# Patient Record
Sex: FEMALE | Race: WHITE | NOT HISPANIC OR LATINO | Employment: FULL TIME | URBAN - METROPOLITAN AREA
[De-identification: names, ages, dates, MRNs, and addresses within clinical notes are randomized per-mention and may not be internally consistent; named-entity substitution may affect disease eponyms.]

---

## 2023-09-19 LAB — URATE (MG/DL) IN SER/PLAS: 5.8 MG/DL (ref 2.3–6.7)

## 2023-10-05 PROBLEM — M79.10 MYALGIA: Status: ACTIVE | Noted: 2023-10-05

## 2023-10-05 PROBLEM — I10 BENIGN DIASTOLIC HYPERTENSION: Status: ACTIVE | Noted: 2023-10-05

## 2023-10-05 PROBLEM — M25.562 ACUTE PAIN OF BOTH KNEES: Status: ACTIVE | Noted: 2023-10-05

## 2023-10-05 PROBLEM — D50.9 IRON DEFICIENCY ANEMIA: Status: ACTIVE | Noted: 2023-10-05

## 2023-10-05 PROBLEM — M17.0 OSTEOARTHRITIS OF KNEES, BILATERAL: Status: ACTIVE | Noted: 2023-10-05

## 2023-10-05 PROBLEM — E03.9 HYPOTHYROIDISM: Status: ACTIVE | Noted: 2023-10-05

## 2023-10-05 PROBLEM — F32.A DEPRESSION: Status: ACTIVE | Noted: 2023-10-05

## 2023-10-05 PROBLEM — R10.2 PELVIC PAIN IN FEMALE: Status: ACTIVE | Noted: 2023-10-05

## 2023-10-05 PROBLEM — G44.209 MUSCLE TENSION HEADACHE: Status: ACTIVE | Noted: 2023-10-05

## 2023-10-05 PROBLEM — M24.9 HYPERMOBILITY OF JOINT: Status: ACTIVE | Noted: 2023-10-05

## 2023-10-05 PROBLEM — M25.549 PAIN IN MULTIPLE FINGER JOINTS: Status: ACTIVE | Noted: 2023-10-05

## 2023-10-05 PROBLEM — N92.6 IRREGULAR MENSES: Status: ACTIVE | Noted: 2023-10-05

## 2023-10-05 PROBLEM — E55.9 VITAMIN D DEFICIENCY: Status: ACTIVE | Noted: 2023-10-05

## 2023-10-05 PROBLEM — M54.50 LOW BACK PAIN: Status: ACTIVE | Noted: 2023-10-05

## 2023-10-05 PROBLEM — M54.32 SCIATICA OF LEFT SIDE: Status: ACTIVE | Noted: 2023-10-05

## 2023-10-05 PROBLEM — N95.0 POST-MENOPAUSAL BLEEDING: Status: ACTIVE | Noted: 2023-10-05

## 2023-10-05 PROBLEM — M25.561 ACUTE PAIN OF BOTH KNEES: Status: ACTIVE | Noted: 2023-10-05

## 2023-10-05 PROBLEM — F41.9 ANXIETY: Status: ACTIVE | Noted: 2023-10-05

## 2023-10-05 PROBLEM — E78.5 HYPERLIPIDEMIA: Status: ACTIVE | Noted: 2023-10-05

## 2023-10-05 PROBLEM — L30.9 ECZEMA: Status: ACTIVE | Noted: 2023-10-05

## 2023-10-05 PROBLEM — B37.0 THRUSH: Status: ACTIVE | Noted: 2023-10-05

## 2023-10-05 RX ORDER — METOPROLOL SUCCINATE 100 MG/1
100 TABLET, EXTENDED RELEASE ORAL DAILY
COMMUNITY
End: 2024-03-01

## 2023-10-05 RX ORDER — LEVOTHYROXINE SODIUM 100 UG/1
1 TABLET ORAL DAILY
COMMUNITY
Start: 2014-06-11 | End: 2023-12-07

## 2023-10-05 RX ORDER — VENLAFAXINE HYDROCHLORIDE 150 MG/1
150 CAPSULE, EXTENDED RELEASE ORAL NIGHTLY
COMMUNITY
End: 2024-01-02

## 2023-10-05 RX ORDER — CHLORTHALIDONE 25 MG/1
12.5 TABLET ORAL DAILY
COMMUNITY
End: 2024-01-02

## 2023-10-05 RX ORDER — CALCIUM CITRATE/VITAMIN D3 315MG-5MCG
TABLET ORAL
COMMUNITY

## 2023-10-05 RX ORDER — BUPROPION HYDROCHLORIDE 300 MG/1
1 TABLET ORAL
COMMUNITY
Start: 2014-06-11 | End: 2023-10-31 | Stop reason: ALTCHOICE

## 2023-10-06 ENCOUNTER — TREATMENT (OUTPATIENT)
Dept: PHYSICAL THERAPY | Facility: CLINIC | Age: 58
End: 2023-10-06
Payer: MEDICAID

## 2023-10-06 DIAGNOSIS — M25.561 CHRONIC PAIN OF BOTH KNEES: Primary | ICD-10-CM

## 2023-10-06 DIAGNOSIS — G89.29 CHRONIC PAIN OF BOTH KNEES: Primary | ICD-10-CM

## 2023-10-06 DIAGNOSIS — M25.562 CHRONIC PAIN OF BOTH KNEES: Primary | ICD-10-CM

## 2023-10-06 PROCEDURE — 97110 THERAPEUTIC EXERCISES: CPT | Mod: GP

## 2023-10-06 NOTE — PROGRESS NOTES
Physical Therapy    Discharge Summary    Name: Thuy Elizondo  MRN: [id number]  : 1965  Date: 10/06/23  Visit Count: 4  Insurance: [employer]; $0 co-pay    Discharge Summary: PT    Discharge Information: Date of discharge 10/6/23    Discharge Status: independent with HEP     Rehab Discharge Reason: Achieved all and/or the most significant goals(s)     Goals:  In 2 weeks, pt will rate pain 0-5 on the numeric pain scale to allow for restful nights of sleep. (Not met, 75% met)  In 2 weeks, pt will be able to sit for 10 minutes without experiencing pain. (100% met)  In 4 weeks, pt's lower extremity strength will grossly be one MMT classification improved. (100% met)  In 4 weeks, pt will be able to perform entire workday without an increase in symptoms. (100% met)  In 6 weeks, pt's LEFS will be +10. (100% met)  In 6 weeks, pt will be independent with HEP. (100% met)    Assessment: Upon re-examination, pt displayed improvements in gross lower extremity strength, balance, functional ability. Pt has met 5/6 goals and is to be discharged home with HEP. Pt verbalizes understanding and agreement in discharge plan. Extensive HEP and various resistance bands provided for increased intensity with HEP.       Plan: Discharge.       Current Problem  1. Chronic pain of both knees             Pain: 2/10       Subjective:   Patient reports that she has been doing okay since last session. Notes that she can do mostly all of her functional and recreational activities. Says that she is ready for today to be discharge.    Objective:   Gait: normal gait pattern    Knee AROM  Flexion  R: 130 deg  L: 132 deg  Extension  R: +1 deg  L: +2 deg     LE MMT   Knee Flexion  R: 5  L: 5  Knee Extension  R: 5  L: 5    Flexibility  Hamstrings: min tight B  Quads: min tight B  Hip Flexors: min tight B    Treatments: pull forward Treatments from previous session or use flowsheet   Therapeutic Exercise  Therapeutic Exercise Performed:  Yes  Therapeutic Exercise Activity 1: upright bike x 5 min  Therapeutic Exercise Activity 2: objective measures  Therapeutic Exercise Activity 3: cybex leg press 70# 2 x 15  Therapeutic Exercise Activity 4: cybex HS curl 30# 2 x 12  Therapeutic Exercise Activity 5: cybex 4 way hip, flexion, abduction 22# 2 x 12 B  Therapeutic Exercise Activity 6: cybex hip abduction/ adduction machine 25# 2 x 12  Therapeutic Exercise Activity 7: biodex x 2 min      PRAVIN EastonPT

## 2023-10-25 ENCOUNTER — APPOINTMENT (OUTPATIENT)
Dept: PRIMARY CARE | Facility: CLINIC | Age: 58
End: 2023-10-25
Payer: MEDICAID

## 2023-10-31 ENCOUNTER — LAB (OUTPATIENT)
Dept: LAB | Facility: LAB | Age: 58
End: 2023-10-31
Payer: MEDICAID

## 2023-10-31 ENCOUNTER — OFFICE VISIT (OUTPATIENT)
Dept: PRIMARY CARE | Facility: CLINIC | Age: 58
End: 2023-10-31
Payer: MEDICAID

## 2023-10-31 VITALS
BODY MASS INDEX: 38.27 KG/M2 | SYSTOLIC BLOOD PRESSURE: 126 MMHG | HEIGHT: 63 IN | DIASTOLIC BLOOD PRESSURE: 70 MMHG | WEIGHT: 216 LBS | HEART RATE: 69 BPM

## 2023-10-31 DIAGNOSIS — F32.A DEPRESSION, UNSPECIFIED DEPRESSION TYPE: ICD-10-CM

## 2023-10-31 DIAGNOSIS — F41.9 ANXIETY: ICD-10-CM

## 2023-10-31 DIAGNOSIS — E78.5 HYPERLIPIDEMIA, UNSPECIFIED HYPERLIPIDEMIA TYPE: ICD-10-CM

## 2023-10-31 DIAGNOSIS — E03.9 HYPOTHYROIDISM, UNSPECIFIED TYPE: ICD-10-CM

## 2023-10-31 DIAGNOSIS — Z80.0 FAMILY HISTORY OF COLON CANCER: ICD-10-CM

## 2023-10-31 DIAGNOSIS — Z12.11 ENCOUNTER FOR SCREENING FOR MALIGNANT NEOPLASM OF COLON: ICD-10-CM

## 2023-10-31 DIAGNOSIS — L30.9 DERMATITIS: ICD-10-CM

## 2023-10-31 DIAGNOSIS — I10 BENIGN DIASTOLIC HYPERTENSION: ICD-10-CM

## 2023-10-31 DIAGNOSIS — E55.9 VITAMIN D DEFICIENCY: ICD-10-CM

## 2023-10-31 DIAGNOSIS — Z00.00 ANNUAL PHYSICAL EXAM: ICD-10-CM

## 2023-10-31 DIAGNOSIS — Z00.00 ANNUAL PHYSICAL EXAM: Primary | ICD-10-CM

## 2023-10-31 PROBLEM — M25.561 CHRONIC PAIN OF RIGHT KNEE: Status: RESOLVED | Noted: 2018-08-01 | Resolved: 2023-10-31

## 2023-10-31 PROBLEM — G89.29 CHRONIC PAIN OF RIGHT KNEE: Status: RESOLVED | Noted: 2018-08-01 | Resolved: 2023-10-31

## 2023-10-31 PROBLEM — K21.9 GERD (GASTROESOPHAGEAL REFLUX DISEASE): Status: ACTIVE | Noted: 2023-10-31

## 2023-10-31 LAB
25(OH)D3 SERPL-MCNC: 67 NG/ML (ref 30–100)
ALBUMIN SERPL BCP-MCNC: 4.2 G/DL (ref 3.4–5)
ALP SERPL-CCNC: 78 U/L (ref 33–110)
ALT SERPL W P-5'-P-CCNC: 18 U/L (ref 7–45)
ANION GAP SERPL CALC-SCNC: 10 MMOL/L (ref 10–20)
APPEARANCE UR: CLEAR
AST SERPL W P-5'-P-CCNC: 17 U/L (ref 9–39)
BACTERIA #/AREA URNS AUTO: ABNORMAL /HPF
BILIRUB SERPL-MCNC: 0.6 MG/DL (ref 0–1.2)
BILIRUB UR STRIP.AUTO-MCNC: NEGATIVE MG/DL
BUN SERPL-MCNC: 11 MG/DL (ref 6–23)
CALCIUM SERPL-MCNC: 9.8 MG/DL (ref 8.6–10.6)
CHLORIDE SERPL-SCNC: 103 MMOL/L (ref 98–107)
CHOLEST SERPL-MCNC: 221 MG/DL (ref 0–199)
CHOLESTEROL/HDL RATIO: 4.8
CO2 SERPL-SCNC: 32 MMOL/L (ref 21–32)
COLOR UR: YELLOW
CREAT SERPL-MCNC: 0.58 MG/DL (ref 0.5–1.05)
ERYTHROCYTE [DISTWIDTH] IN BLOOD BY AUTOMATED COUNT: 13.2 % (ref 11.5–14.5)
EST. AVERAGE GLUCOSE BLD GHB EST-MCNC: 100 MG/DL
GFR SERPL CREATININE-BSD FRML MDRD: >90 ML/MIN/1.73M*2
GLUCOSE SERPL-MCNC: 94 MG/DL (ref 74–99)
GLUCOSE UR STRIP.AUTO-MCNC: NEGATIVE MG/DL
HBA1C MFR BLD: 5.1 %
HCT VFR BLD AUTO: 41.7 % (ref 36–46)
HDLC SERPL-MCNC: 46.5 MG/DL
HGB BLD-MCNC: 13 G/DL (ref 12–16)
KETONES UR STRIP.AUTO-MCNC: NEGATIVE MG/DL
LDLC SERPL CALC-MCNC: 151 MG/DL
LEUKOCYTE ESTERASE UR QL STRIP.AUTO: ABNORMAL
MCH RBC QN AUTO: 29.5 PG (ref 26–34)
MCHC RBC AUTO-ENTMCNC: 31.2 G/DL (ref 32–36)
MCV RBC AUTO: 95 FL (ref 80–100)
MUCOUS THREADS #/AREA URNS AUTO: ABNORMAL /LPF
NITRITE UR QL STRIP.AUTO: NEGATIVE
NON HDL CHOLESTEROL: 175 MG/DL (ref 0–149)
NRBC BLD-RTO: 0 /100 WBCS (ref 0–0)
PH UR STRIP.AUTO: 7 [PH]
PLATELET # BLD AUTO: 225 X10*3/UL (ref 150–450)
PMV BLD AUTO: 11.8 FL (ref 7.5–11.5)
POTASSIUM SERPL-SCNC: 4 MMOL/L (ref 3.5–5.3)
PROT SERPL-MCNC: 6.6 G/DL (ref 6.4–8.2)
PROT UR STRIP.AUTO-MCNC: NEGATIVE MG/DL
RBC # BLD AUTO: 4.4 X10*6/UL (ref 4–5.2)
RBC # UR STRIP.AUTO: NEGATIVE /UL
RBC #/AREA URNS AUTO: ABNORMAL /HPF
SODIUM SERPL-SCNC: 141 MMOL/L (ref 136–145)
SP GR UR STRIP.AUTO: 1.02
SQUAMOUS #/AREA URNS AUTO: ABNORMAL /HPF
TRIGL SERPL-MCNC: 117 MG/DL (ref 0–149)
TSH SERPL-ACNC: 3.45 MIU/L (ref 0.44–3.98)
UROBILINOGEN UR STRIP.AUTO-MCNC: <2 MG/DL
VLDL: 23 MG/DL (ref 0–40)
WBC # BLD AUTO: 6 X10*3/UL (ref 4.4–11.3)
WBC #/AREA URNS AUTO: ABNORMAL /HPF

## 2023-10-31 PROCEDURE — 85027 COMPLETE CBC AUTOMATED: CPT

## 2023-10-31 PROCEDURE — 84443 ASSAY THYROID STIM HORMONE: CPT

## 2023-10-31 PROCEDURE — 83036 HEMOGLOBIN GLYCOSYLATED A1C: CPT

## 2023-10-31 PROCEDURE — 81001 URINALYSIS AUTO W/SCOPE: CPT

## 2023-10-31 PROCEDURE — 36415 COLL VENOUS BLD VENIPUNCTURE: CPT

## 2023-10-31 PROCEDURE — 1036F TOBACCO NON-USER: CPT | Performed by: INTERNAL MEDICINE

## 2023-10-31 PROCEDURE — 80061 LIPID PANEL: CPT

## 2023-10-31 PROCEDURE — 3078F DIAST BP <80 MM HG: CPT | Performed by: INTERNAL MEDICINE

## 2023-10-31 PROCEDURE — 99396 PREV VISIT EST AGE 40-64: CPT | Performed by: INTERNAL MEDICINE

## 2023-10-31 PROCEDURE — 3074F SYST BP LT 130 MM HG: CPT | Performed by: INTERNAL MEDICINE

## 2023-10-31 PROCEDURE — 82306 VITAMIN D 25 HYDROXY: CPT

## 2023-10-31 PROCEDURE — 80053 COMPREHEN METABOLIC PANEL: CPT

## 2023-10-31 NOTE — PROGRESS NOTES
"Subjective   Patient ID: Thyu Elizondo is a 58 y.o. female who presents for Annual Exam.    HPI   Patient is a 58-year-old  female who comes today for an annual wellness exam and lab work.  She had a negative Pap in March 2023 and her mammogram was also benign in March 2023.  Patient sees gynecology regarding Pap/pelvic as well as mammogram.  She claims she has had a colonoscopy in the past but not for quite some time and claims her grandmother had colon cancer at a young age.  Patient is up-to-date on vaccinations including Shingrix.  She has been compliant with her medications and reports no side effects.  Pt denies, fever, chills, CP, SOB, abdominal pain, N/V/D/C or  symptoms. No HA, dizziness, numbness or weakness.  No loss of weight, loss of appetite, melena, rectal bleeding or sleep issues.  Mood is stable on current treatment.  Patient wears reading glasses and vision is stable.  She complains of an itchy rash on her left shin and 2 red spots on the right shin which have been there for several years.  Review of Systems  As per [facility]  Objective   /70 (BP Location: Right arm, Patient Position: Sitting, BP Cuff Size: Large adult)   Pulse 69   Ht 1.6 m (5' 3\")   Wt 98 kg (216 lb)   BMI 38.26 kg/m²     Physical Exam  General - Well developed, well appearing, obese middle-aged  female in no acute respiratory distress  Eyes - normal conjunctiva with no pallor or icterus, normal extraocular movements  ENT - normal external auditory canals and tympanic membranes, throat clear with no exudates  Neck - No JVD, thyromegaly or lymphadenopathy  Lungs - no respiratory distress and lungs clear to auscultation bilaterally with no rales or wheezes  Heart - normal S1, S2 with normal heart rate, rhythm and no murmurs   Breasts, pelvic and pap - per gyn  Abdomen -  soft, nontender with no masses or organomegaly,  Extremities - no cyanosis or pedal edema  Neuro - grossly normal neuro exam with no focal " neuro deficits  Psych - normal mental status, mood and affect   Skin -few erythematous macules with excoriations on the left shin, 2 red papules on the right shin   MSK - normal gait with grossly normal ROM of major joints  Assessment/Plan     1.  Health maintenance exam-patient is up-to-date on vaccinations, Pap/pelvic/mammogram, routine labs ordered, referral provided for a colonoscopy  2.  Hypertension-controlled and patient will continue current treatment  3.  History of hyperlipidemia-fasting lipids ordered, patient is currently not on any drug therapy  4.  Hypothyroidism-patient is on levothyroxine, TSH ordered  5.  History of vitamin D deficiency-vitamin D 25-hydroxy level ordered  6.  History of anxiety, depression-improved and stable and patient will continue current treatment  7.  Family history of colon cancer-referral provided for colonoscopy  8.  Dermatitis-I have advised patient to use 2.5% hydrocortisone cream to the itchy areas on her left shin and to also schedule an appointment with dermatology regarding the chronic red lesions she has had for several years on her right shin  Follow-up in 6 months.  Discussed with patient regarding need to watch diet and exercise regularly to aid in weight loss.  30 minutes spent rooming the patient, reviewing records, eliciting history, examining patient, counseling, coordination of care and in documentation.  This note was partially generated using the Dragon voice recognition system. There may be some incorrect words, spelling and punctuation errors that were not corrected prior to committing the note to the patient's medical record.

## 2023-11-02 ENCOUNTER — TELEPHONE (OUTPATIENT)
Dept: PRIMARY CARE | Facility: CLINIC | Age: 58
End: 2023-11-02
Payer: MEDICAID

## 2023-11-02 DIAGNOSIS — N39.0 URINARY TRACT INFECTION WITHOUT HEMATURIA, SITE UNSPECIFIED: Primary | ICD-10-CM

## 2023-11-02 RX ORDER — SULFAMETHOXAZOLE AND TRIMETHOPRIM 800; 160 MG/1; MG/1
1 TABLET ORAL 2 TIMES DAILY
Qty: 14 TABLET | Refills: 0 | Status: SHIPPED | OUTPATIENT
Start: 2023-11-02 | End: 2023-11-09

## 2023-11-02 NOTE — TELEPHONE ENCOUNTER
Pt called back stating that she will take the ABX for the UTI.  She stated the more she thought about it the more she wanted to see if the abx would help her symptoms that she was having.  Pt stated she never had a uti before so she wasn't sure if she had one or not.  Pressure, some pain.     [facility].       BACTRIM WAS SENT TO [facility].

## 2023-11-15 ENCOUNTER — TELEPHONE (OUTPATIENT)
Dept: PRIMARY CARE | Facility: CLINIC | Age: 58
End: 2023-11-15
Payer: MEDICAID

## 2023-11-15 DIAGNOSIS — B37.0 THRUSH: Primary | ICD-10-CM

## 2023-11-15 RX ORDER — NYSTATIN 100000 [USP'U]/ML
5 SUSPENSION ORAL 3 TIMES DAILY
Qty: 105 ML | Refills: 0 | Status: SHIPPED | OUTPATIENT
Start: 2023-11-15 | End: 2023-11-22

## 2023-11-15 NOTE — TELEPHONE ENCOUNTER
Patient stated she has thrush on her tongue again. She wants to know if you would prescribe nystatin oral liquid medication. If so, please send prescription to [facility] in [facility].

## 2023-12-18 ENCOUNTER — ANCILLARY PROCEDURE (OUTPATIENT)
Dept: RADIOLOGY | Facility: CLINIC | Age: 58
End: 2023-12-18
Payer: MEDICAID

## 2023-12-18 ENCOUNTER — OFFICE VISIT (OUTPATIENT)
Dept: PRIMARY CARE | Facility: CLINIC | Age: 58
End: 2023-12-18
Payer: MEDICAID

## 2023-12-18 VITALS
DIASTOLIC BLOOD PRESSURE: 68 MMHG | SYSTOLIC BLOOD PRESSURE: 122 MMHG | BODY MASS INDEX: 36.86 KG/M2 | HEIGHT: 63 IN | HEART RATE: 77 BPM | WEIGHT: 208 LBS

## 2023-12-18 DIAGNOSIS — M54.2 NECK PAIN: ICD-10-CM

## 2023-12-18 DIAGNOSIS — R20.0 NUMBNESS: Primary | ICD-10-CM

## 2023-12-18 DIAGNOSIS — G44.209 MUSCLE TENSION HEADACHE: ICD-10-CM

## 2023-12-18 DIAGNOSIS — E03.9 HYPOTHYROIDISM, UNSPECIFIED TYPE: ICD-10-CM

## 2023-12-18 DIAGNOSIS — F41.9 ANXIETY: ICD-10-CM

## 2023-12-18 DIAGNOSIS — R20.0 NUMBNESS: ICD-10-CM

## 2023-12-18 DIAGNOSIS — F32.A DEPRESSION, UNSPECIFIED DEPRESSION TYPE: ICD-10-CM

## 2023-12-18 DIAGNOSIS — E55.9 VITAMIN D DEFICIENCY: ICD-10-CM

## 2023-12-18 PROCEDURE — 3074F SYST BP LT 130 MM HG: CPT | Performed by: INTERNAL MEDICINE

## 2023-12-18 PROCEDURE — 72040 X-RAY EXAM NECK SPINE 2-3 VW: CPT | Performed by: RADIOLOGY

## 2023-12-18 PROCEDURE — 72040 X-RAY EXAM NECK SPINE 2-3 VW: CPT

## 2023-12-18 PROCEDURE — 1036F TOBACCO NON-USER: CPT | Performed by: INTERNAL MEDICINE

## 2023-12-18 PROCEDURE — 99214 OFFICE O/P EST MOD 30 MIN: CPT | Performed by: INTERNAL MEDICINE

## 2023-12-18 PROCEDURE — 3078F DIAST BP <80 MM HG: CPT | Performed by: INTERNAL MEDICINE

## 2023-12-18 RX ORDER — CYCLOBENZAPRINE HCL 5 MG
5 TABLET ORAL NIGHTLY PRN
Qty: 30 TABLET | Refills: 0 | Status: SHIPPED | OUTPATIENT
Start: 2023-12-18 | End: 2024-02-16

## 2023-12-18 RX ORDER — METHYLPREDNISOLONE 4 MG/1
TABLET ORAL
Qty: 21 TABLET | Refills: 0 | Status: SHIPPED | OUTPATIENT
Start: 2023-12-18 | End: 2023-12-25

## 2023-12-18 ASSESSMENT — ENCOUNTER SYMPTOMS
OCCASIONAL FEELINGS OF UNSTEADINESS: 0
LOSS OF SENSATION IN FEET: 0
DEPRESSION: 0

## 2023-12-18 NOTE — PROGRESS NOTES
"Subjective   Patient ID: Thuy Elizondo is a 58 y.o. female who presents for Numbness (L arm).    HPI   Patient is a 58-year-old  female who comes today for an acute visit.  She complains of 5-day history of tingling and numbness in her left upper arm as well as forearm.  She does admit to having neck pain/tightness for quite some time and has also been diagnosed with tension headaches on the left side of her head.  Patient denies any fever, chills, chest pain, shortness of breath, cough, dizziness, palpitations, syncope, abdominal pain, nausea, vomiting, loss of appetite or genitourinary symptoms.  Labs done after recent annual wellness exam were reviewed and discussed with patient-TSH was okay, HbA1c was normal, CBC was normal, CMP unremarkable, total cholesterol was elevated and patient claims she has been watching her diet in this regard.  Review of Systems  As per [facility]  Objective   /68 (BP Location: Right arm, Patient Position: Sitting, BP Cuff Size: Large adult)   Pulse 77   Ht 1.6 m (5' 3\")   Wt 94.3 kg (208 lb)   BMI 36.85 kg/m²     Physical Exam  General - Well developed, well appearing, obese middle-aged  female in no acute respiratory distress  Eyes - normal conjunctiva with no pallor or icterus, normal extraocular movements  Neck - No JVD, thyromegaly or lymphadenopathy, tightness noted on left side of neck  Lungs - no respiratory distress and lungs clear to auscultation bilaterally with no rales or wheezes  Heart - normal S1, S2 with normal heart rate, rhythm and no murmurs   Abdomen - obese, soft, nontender with no masses or organomegaly,  Extremities - no cyanosis or pedal edema  Neuro - grossly normal neuro exam with no focal neuro deficits  Psych - normal mental status, mood and affect   Skin - no rashes or ulcers  MSK - normal gait with grossly normal ROM of major joints, range of motion of C-spine is okay  Assessment/Plan     1.  Left upper extremity numbness associated " with left-sided neck pain-suggestive of cervical radiculopathy, x-ray of the C-spine ordered, Medrol Dosepak prescribed, physical therapy has also been ordered  2.  Left-sided neck tightness-x-ray of the C-spine ordered, cyclobenzaprine 5 mg nightly prescribed, patient has been warned regarding side effects of sedation/drowsiness  3.  Left-sided tension headache-cyclobenzaprine 5 mg nightly prescribed  4.  Hypothyroidism-stable, recent TSH was okay, patient will continue same dose of her thyroxine  5.  History of vitamin D deficiency-improved, recent level was normal  6.  History of SARMAD, depression-stable and patient will continue current dose of venlafaxine  Follow-up in 3 months.  30 minutes spent rooming the patient, reviewing records, eliciting history, examining patient, counseling, coordination of care and in documentation.  This note was partially generated using the Dragon voice recognition system. There may be some incorrect words, spelling and punctuation errors that were not corrected prior to committing the note to the patient's medical record.

## 2023-12-20 DIAGNOSIS — M54.2 NECK PAIN: Primary | ICD-10-CM

## 2023-12-20 DIAGNOSIS — M50.30 DEGENERATIVE DISC DISEASE, CERVICAL: ICD-10-CM

## 2024-01-02 ENCOUNTER — HOSPITAL ENCOUNTER (OUTPATIENT)
Dept: RADIOLOGY | Facility: HOSPITAL | Age: 59
Discharge: HOME | End: 2024-01-02
Payer: MEDICAID

## 2024-01-02 ENCOUNTER — OFFICE VISIT (OUTPATIENT)
Dept: ORTHOPEDIC SURGERY | Facility: HOSPITAL | Age: 59
End: 2024-01-02
Payer: MEDICAID

## 2024-01-02 VITALS — BODY MASS INDEX: 35.44 KG/M2 | WEIGHT: 200 LBS | HEIGHT: 63 IN

## 2024-01-02 DIAGNOSIS — M17.12 ARTHRITIS OF LEFT KNEE: ICD-10-CM

## 2024-01-02 DIAGNOSIS — M25.562 LEFT KNEE PAIN, UNSPECIFIED CHRONICITY: ICD-10-CM

## 2024-01-02 PROCEDURE — 73564 X-RAY EXAM KNEE 4 OR MORE: CPT | Mod: LEFT SIDE | Performed by: RADIOLOGY

## 2024-01-02 PROCEDURE — 73564 X-RAY EXAM KNEE 4 OR MORE: CPT | Mod: LT

## 2024-01-02 PROCEDURE — 99204 OFFICE O/P NEW MOD 45 MIN: CPT | Performed by: STUDENT IN AN ORGANIZED HEALTH CARE EDUCATION/TRAINING PROGRAM

## 2024-01-02 PROCEDURE — 99214 OFFICE O/P EST MOD 30 MIN: CPT | Performed by: STUDENT IN AN ORGANIZED HEALTH CARE EDUCATION/TRAINING PROGRAM

## 2024-01-02 PROCEDURE — 1036F TOBACCO NON-USER: CPT | Performed by: STUDENT IN AN ORGANIZED HEALTH CARE EDUCATION/TRAINING PROGRAM

## 2024-01-02 PROCEDURE — L1812 KO ELASTIC W/JOINTS PRE OTS: HCPCS | Performed by: STUDENT IN AN ORGANIZED HEALTH CARE EDUCATION/TRAINING PROGRAM

## 2024-01-02 RX ORDER — MELOXICAM 7.5 MG/1
7.5 TABLET ORAL DAILY PRN
Qty: 15 TABLET | Refills: 0 | Status: SHIPPED | OUTPATIENT
Start: 2024-01-02 | End: 2024-02-01

## 2024-01-02 ASSESSMENT — PAIN SCALES - GENERAL: PAINLEVEL_OUTOF10: 6

## 2024-01-02 ASSESSMENT — PAIN DESCRIPTION - DESCRIPTORS: DESCRIPTORS: SHARP;THROBBING

## 2024-01-02 ASSESSMENT — PAIN - FUNCTIONAL ASSESSMENT: PAIN_FUNCTIONAL_ASSESSMENT: 0-10

## 2024-01-02 NOTE — PROGRESS NOTES
REFERRAL SOURCE: No ref. provider found     CHIEF COMPLAINT: left knee pain    HISTORY OF PRESENT ILLNESS  Thuy Elizondo is a very pleasant 58 y.o. female with history of hypertension, hyperlipidemia, hypothyroidism, GERD, vitamin D deficiency who is here for evaluation of left knee pain.     1/2/24: Her left knee pain started 4 days ago without injury.  She did report that she was doing some squatting while working in the kitchen, but initially got up and was okay.  She then went to work on Saturday and was doing all right, but pain worsened following this.  Pain also worsened on Sunday.  Pain is diffuse and she does have some clicking and pain limited weakness.  Denies true locking.  She has a history of radiating pain in the left leg and arm and does have an upcoming appointment with our spine physicians.  She is taking ibuprofen 400 mg as needed, but it bothers her stomach.  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  She has done physical therapy and had injections in her right knee, but no significant treatment targeted at the left in the past.    MEDS    Current Outpatient Medications:     albuterol 90 mcg/actuation aerosol powdr breath activated inhaler, Inhale., Disp: , Rfl:     chlorthalidone (Hygroton) 25 mg tablet, Take 1/2 (one-half) tablet by mouth once daily, Disp: 45 tablet, Rfl: 0    cyclobenzaprine (Flexeril) 5 mg tablet, Take 1 tablet (5 mg) by mouth as needed at bedtime for muscle spasms., Disp: 30 tablet, Rfl: 0    ferrous sulfate, dried (Slow Release Iron) 160 mg (50 mg iron) ER tablet, Take by mouth., Disp: , Rfl:     levothyroxine (Synthroid, Levoxyl) 100 mcg tablet, Take 1 tablet by mouth once daily, Disp: 90 tablet, Rfl: 0    metoprolol succinate XL (Toprol-XL) 100 mg 24 hr tablet, Take 1 tablet (100 mg) by mouth once daily. Do not crush or chew., Disp: , Rfl:     venlafaxine XR (Effexor-XR) 150 mg 24 hr capsule, Take 1 capsule by mouth at bedtime,  Disp: 90 capsule, Rfl: 0    ALLERGIES  Allergies   Allergen Reactions    Animal Dander Unknown    Egg Unknown       PAST MEDICAL HISTORY  Past Medical History:   Diagnosis Date    Abnormal uterine bleeding 2015    Depression, unspecified 2014    Depression    Hypothyroidism, unspecified 2014    Hypothyroidism    Personal history of diseases of the blood and blood-forming organs and certain disorders involving the immune mechanism 2014    History of anemia       PAST SURGICAL HISTORY  Past Surgical History:   Procedure Laterality Date    CARPAL TUNNEL RELEASE  2014    Neuroplasty Decompression Median Nerve At Carpal Tunnel     SECTION, CLASSIC  2014     Section    TONSILLECTOMY  2014    Tonsillectomy       SOCIAL HISTORY   Social History     Socioeconomic History    Marital status:      Spouse name: Not on file    Number of children: Not on file    Years of education: Not on file    Highest education level: Not on file   Occupational History    Not on file   Tobacco Use    Smoking status: Never    Smokeless tobacco: Never   Substance and Sexual Activity    Alcohol use: Yes    Drug use: Never    Sexual activity: Not on file   Other Topics Concern    Not on file   Social History Narrative    Not on file     Social Determinants of Health     Financial Resource Strain: Not on file   Food Insecurity: Not on file   Transportation Needs: Not on file   Physical Activity: Not on file   Stress: Not on file   Social Connections: Not on file   Intimate Partner Violence: Not on file   Housing Stability: Not on file       FAMILY HISTORY  Family History   Problem Relation Name Age of Onset    Hypertension Mother      Diabetes Father         REVIEW OF SYSTEMS  Except for those mentioned in the history of present illness, and below, a complete review of systems is negative.     Review of Systems    VITALS  There were no vitals filed for this visit.    PHYSICAL  EXAMINATION   GENERAL:  Awake, alert, and oriented, no apparent distress, pleasant, and cooperative  PSYC: Mood is euthymic, affect is congruent  EAR, NOSE, THROAT:  Normocephalic, atraumatic, moist membranes, anicteric sclera  LUNG: Nonlabored breathing  HEART: No clubbing or cyanosis  SKIN: No increased erythema, warmth, rashes, or concerning skin lesions  NEURO: Sensation is intact in the bilateral lower extremities. Strength is grossly 5 out of 5 throughout the bilateral lower extremities, unless noted below.  GAIT: Non-antalgic  MUSCULOSKELETAL: Examination of the left knee: Range of motion is full and pain-free.  Mild effusion. No patellar apprehension.  Tender to palpation over the adductor and hamstring musculature as well as the quadriceps tendon and medial joint line. Varus and valgus stress test are negative. Lachman's negative. Posterior drawer is negative. Camelia's and meniscal grind tests are negative.     IMAGING STUDIES:   Radiographs left knee dated 1/2/2024 were personally reviewed and interpreted by me, Dr. Kimberly Ingram, and the findings shared with the patient.  Small spur in the medial and patellofemoral compartments with mild medial compartment joint space narrowing.     IMPRESSION  #1  Acute exacerbation of chronic left knee osteoarthritis  #2  Left adductor, hamstring, and quadriceps muscle strains    PLAN  The following was discussed with the patient:     Thuy Elizondo is a very pleasant 58 y.o. female with history of hypertension, hyperlipidemia, hypothyroidism, GERD, vitamin D deficiency who is here for evaluation of left knee pain due to acute exacerbation of chronic left knee osteoarthritis with strains of the surrounding musculature.  -We discussed the above.  -She was given a referral to physical therapy today and we discussed that this is the mainstay of treatment.  -She was also given a prescription for meloxicam 7.5 mg which she can take daily as needed.  She was counseled on  side effects and counseled to stop ibuprofen when taking this.  She was getting reflux with the ibuprofen.  -We briefly discussed the role of corticosteroid injections.  She had success with 1 injection in the right knee, but had a subsequent injection that was not beneficial.  Therefore, she is not interested in a steroid injection at this time.  -She does have a subjective feeling of instability related to pain.  Therefore, we will prescribe a knee hinged knee brace to provide increased proprioception and some additional stability for her left knee.  -Follow-up after 6-8 weeks of physical therapy if not improved, or sooner if needed.    The patient was counseled to remain active, but avoid activities that worsen symptoms. The patient was in agreement with this plan. All questions were answered to the best of my ability.    PATIENT EDUCATION:  Education was discussed at today's appointment. A learning needs assessment was performed.    Primary learner: Thuy Elizondo  Barriers to learning: None  Preferred language: English  Learning preferences include: Seeing and doing.  Discussed: Diagnosis and treatment plan.  Demonstrated: Understanding of material discussed.  Patient education materials given: None.  Learner response: Learner demonstrated understanding.    This note was dictated using Dragon speech recognition software and was not corrected for spelling or grammatical errors.

## 2024-01-16 ENCOUNTER — APPOINTMENT (OUTPATIENT)
Dept: ORTHOPEDIC SURGERY | Facility: CLINIC | Age: 59
End: 2024-01-16
Payer: MEDICAID

## 2024-01-16 ENCOUNTER — OFFICE VISIT (OUTPATIENT)
Dept: ORTHOPEDIC SURGERY | Facility: CLINIC | Age: 59
End: 2024-01-16
Payer: MEDICAID

## 2024-01-16 DIAGNOSIS — M54.16 LUMBAR RADICULOPATHY: ICD-10-CM

## 2024-01-16 DIAGNOSIS — M54.12 CERVICAL RADICULOPATHY: ICD-10-CM

## 2024-01-16 PROCEDURE — 99203 OFFICE O/P NEW LOW 30 MIN: CPT | Performed by: ORTHOPAEDIC SURGERY

## 2024-01-16 PROCEDURE — 99213 OFFICE O/P EST LOW 20 MIN: CPT | Performed by: ORTHOPAEDIC SURGERY

## 2024-01-16 PROCEDURE — 1036F TOBACCO NON-USER: CPT | Performed by: ORTHOPAEDIC SURGERY

## 2024-01-16 NOTE — PROGRESS NOTES
HPI:Thuy Elizondo is a 58-year-old woman, who comes in with complaints of approximately 12 months worth of tingling in the left upper extremity and in the left lower extremity.  She denies any myelopathic symptoms.  She has not had physical therapy or other treatment.  The symptoms come and go.  She is getting some associated leg pain with the numbness and tingling.      ROS:  Reviewed on EMR and patient intake sheet.    PMH/SH:   Reviewed on EMR and patient intake sheet.    Exam:  Physical Exam    Constitutional: Well appearing; no acute distress  Eyes: pupils are equal and round  Psych: normal affect  Respiratory: non-labored breathing  Cardiovascular: regular rate and rhythm  GI: non-distended abdomen  Musculoskeletal: no pain with range of motion of the shoulders bilaterally; no signs of impingement  Neurologic: [5]/5 strength in the upper extremities bilaterally]; [negative Alex's]; [no hyper-reflexia]; negative Spurling's    Radiology:  X-rays cervical spine demonstrate moderate disc degeneration at C5-6 and C6-C7.    Diagnosis:  Cervical radiculopathy; lumbar radiculopathy    Assessment and Plan:   58-year-old woman, with chronic cervical and lumbar radicular symptoms.  At this time I recommended physical therapy and anti-inflammatories.  If the symptoms persist despite therapy, then an MRI of the lumbar spine and follow-up would be appropriate.    The patient was in agreement with the plan. At the end of the visit today, the patient felt that all questions had been answered satisfactorily.  The patient was pleased with the visit and very appreciative for the care rendered.     Thank you very much for the kind referral.  It is a privilege, and a pleasure, to partner with you in the care of your patients.  I would be delighted to assist you with any further consultations as needed.      David Ndiaye MD    Chief of Spine Surgery, [facility]  Director of Spine Service,  [facility]  , Department of Orthopaedics  [facility] [employer]  [address]  [place] [id number]  P: [phone]    This note was dictated with voice recognition software.  It has not been proofread for grammatical errors, typographical mistakes or other semantic inconsistencies.

## 2024-01-16 NOTE — LETTER
January 16, 2024     Renetta Coffman MD  [address]  [facility] [address]  [facility] [address]    Patient: Thuy Elizondo   YOB: 1965   Date of Visit: 1/16/2024       Dear Dr. Renetta Coffman MD:    Thank you for referring Thuy Elizondo to me for evaluation. Below are my notes for this consultation.  If you have questions, please do not hesitate to call me. I look forward to following your patient along with you.       Sincerely,     David Ndiaye MD      CC: No Recipients  ______________________________________________________________________________________    HPI:Thuy Elizondo is a 58-year-old woman, who comes in with complaints of approximately 12 months worth of tingling in the left upper extremity and in the left lower extremity.  She denies any myelopathic symptoms.  She has not had physical therapy or other treatment.  The symptoms come and go.  She is getting some associated leg pain with the numbness and tingling.      ROS:  Reviewed on EMR and patient intake sheet.    PMH/SH:   Reviewed on EMR and patient intake sheet.    Exam:  Physical Exam    Constitutional: Well appearing; no acute distress  Eyes: pupils are equal and round  Psych: normal affect  Respiratory: non-labored breathing  Cardiovascular: regular rate and rhythm  GI: non-distended abdomen  Musculoskeletal: no pain with range of motion of the shoulders bilaterally; no signs of impingement  Neurologic: [5]/5 strength in the upper extremities bilaterally]; [negative Alex's]; [no hyper-reflexia]; negative Spurling's    Radiology:  X-rays cervical spine demonstrate moderate disc degeneration at C5-6 and C6-C7.    Diagnosis:  Cervical radiculopathy; lumbar radiculopathy    Assessment and Plan:   58-year-old woman, with chronic cervical and lumbar radicular symptoms.  At this time I recommended physical therapy and anti-inflammatories.  If the symptoms persist despite therapy, then an MRI of the lumbar spine  and follow-up would be appropriate.    The patient was in agreement with the plan. At the end of the visit today, the patient felt that all questions had been answered satisfactorily.  The patient was pleased with the visit and very appreciative for the care rendered.     Thank you very much for the kind referral.  It is a privilege, and a pleasure, to partner with you in the care of your patients.  I would be delighted to assist you with any further consultations as needed.      David Ndiaye MD    Chief of Spine Surgery, [facility]  Director of Spine Service, [facility]  , Department of Orthopaedics  [facility] [employer] [facility]  [address] [place] [address]  P: [phone]    This note was dictated with voice recognition software.  It has not been proofread for grammatical errors, typographical mistakes or other semantic inconsistencies.

## 2024-03-27 DIAGNOSIS — I10 BENIGN DIASTOLIC HYPERTENSION: ICD-10-CM

## 2024-03-27 DIAGNOSIS — F32.A DEPRESSION, UNSPECIFIED DEPRESSION TYPE: ICD-10-CM

## 2024-03-28 ENCOUNTER — OFFICE VISIT (OUTPATIENT)
Dept: ORTHOPEDIC SURGERY | Facility: CLINIC | Age: 59
End: 2024-03-28
Payer: MEDICAID

## 2024-03-28 DIAGNOSIS — M17.12 PRIMARY OSTEOARTHRITIS OF LEFT KNEE: Primary | ICD-10-CM

## 2024-03-28 PROCEDURE — 99214 OFFICE O/P EST MOD 30 MIN: CPT | Performed by: ORTHOPAEDIC SURGERY

## 2024-03-28 RX ORDER — CHLORTHALIDONE 25 MG/1
25 TABLET ORAL DAILY
Qty: 45 TABLET | Refills: 0 | Status: SHIPPED | OUTPATIENT
Start: 2024-03-28

## 2024-03-28 RX ORDER — VENLAFAXINE HYDROCHLORIDE 150 MG/1
150 CAPSULE, EXTENDED RELEASE ORAL NIGHTLY
Qty: 90 CAPSULE | Refills: 0 | Status: SHIPPED | OUTPATIENT
Start: 2024-03-28

## 2024-03-28 NOTE — LETTER
"March 28, 2024     Renetta Coffman MD  [address]  [facility] [address]  [facility] [place] [address]    Patient: Thuy Elizondo   YOB: 1965   Date of Visit: 3/28/2024       Dear Dr. Renetta Coffman MD:    Thank you for referring Thuy Elizondo to me for evaluation. Below are my notes for this consultation.  If you have questions, please do not hesitate to call me. I look forward to following your patient along with you.       Sincerely,     Jah Moulton MD      CC: No Recipients  ______________________________________________________________________________________    This 59-year-old woman complains of left knee pain.  She notes she has had pain in the left knee since Edward when she was doing a lot of squatting.    The patient notes the pain is about the medial aspect of her knee and wakes her up at night when she turns in bed.  She notes stairs exacerbate the pain.  She denies swelling giving way locking or crepitus.  The patient denies any neurologic symptoms in the lower extremities.    The patient's been treating herself with ibuprofen, topical medications and is use meloxicam.  She notes she is \"getting better\".    The patient's situation is further complicated by her past medical history of hypermobility of joint, low back pain, myalgia, sciatica on the left side, anxiety, depression, hypertension, gastroesophageal reflux disease and a BMI greater than 35.    Review of systems:  A complete review of the patient's past medical history and review of 30 systems and all medications and allergies was performed. Please see adult medical record for details.    A Family history for genetic or familial inheritance issues and Social history including smoking history, alcohol consumption and exercise activities was also reviewed and significant findings noted in the patients history of present illness.    Physical Exam:  The patient is well-nourished and well-developed and in no acute " distress. The patient displayed normal mood and affect. The patient's pupils were equal, round sclerae are white. Patient's respirations appear to be regular and unlabored.     Physical examination of the left knee revealed no effusion in the knee and there were no skin abnormalities or lymphangitis. The knee demonstrated varus alignment. There was no tenderness about the knee, thigh or calf. There was tenderness at the medial joint space. There was no discomfort with patellofemoral compression. The knee came to within 5 degrees of full extension. Straight leg raising was without lag, flexion was to 120 degrees and ligamentous stability was full. The neurovascular examination extremity was intact.The neurological exam including motor and sensory exam was performed. The vascular examination including palpation of pulses and capillary refill of the foot was performed and determined to be intact.    Imaging:  I personally reviewed and measured the radiographs including AP and lateral views of the extremity and they revealed moderate osteoarthritis of the knee with narrowing of the medial joint space.    Impression & Plan:   It is my impression this patient has osteoarthritis of her left knee.    I discussed the judicious use of nonsteroidal anti-inflammatory medications.  They should not take the medications if they have cardiac or cardiovascular risks without first checking with her primary care physician.  We discussed the risks versus benefits of anti-inflammatory medications.  The need to take the medications with food in order to decrease the risk of gastrointestinal complications.  The patient should inform their primary care physician that they are taking nonsteroidal anti-inflammatory medications so that their cardiac and cardiovascular systems, as well as monitor kidney and liver function appropriately.  They should not take these medications over an extended period of time.    I have stressed the importance  of weight loss in order to decrease the progression of her osteoarthritis of her lower extremity weightbearing joint.  We discussed proper eating habits.    I would be delighted to see the patient back the future should  they  have further problems.    Note dictated with xPeerient software.  Completed without full type editing and sent to avoid delay.

## 2024-03-28 NOTE — PROGRESS NOTES
"This 59-year-old woman complains of left knee pain.  She notes she has had pain in the left knee since Sergio when she was doing a lot of squatting.    The patient notes the pain is about the medial aspect of her knee and wakes her up at night when she turns in bed.  She notes stairs exacerbate the pain.  She denies swelling giving way locking or crepitus.  The patient denies any neurologic symptoms in the lower extremities.    The patient's been treating herself with ibuprofen, topical medications and is use meloxicam.  She notes she is \"getting better\".    The patient's situation is further complicated by her past medical history of hypermobility of joint, low back pain, myalgia, sciatica on the left side, anxiety, depression, hypertension, gastroesophageal reflux disease and a BMI greater than 35.    Review of systems:  A complete review of the patient's past medical history and review of 30 systems and all medications and allergies was performed. Please see adult medical record for details.    A Family history for genetic or familial inheritance issues and Social history including smoking history, alcohol consumption and exercise activities was also reviewed and significant findings noted in the patients history of present illness.    Physical Exam:  The patient is well-nourished and well-developed and in no acute distress. The patient displayed normal mood and affect. The patient's pupils were equal, round sclerae are white. Patient's respirations appear to be regular and unlabored.     Physical examination of the left knee revealed no effusion in the knee and there were no skin abnormalities or lymphangitis. The knee demonstrated varus alignment. There was no tenderness about the knee, thigh or calf. There was tenderness at the medial joint space. There was no discomfort with patellofemoral compression. The knee came to within 5 degrees of full extension. Straight leg raising was without lag, flexion was to " 120 degrees and ligamentous stability was full. The neurovascular examination extremity was intact.The neurological exam including motor and sensory exam was performed. The vascular examination including palpation of pulses and capillary refill of the foot was performed and determined to be intact.    Imaging:  I personally reviewed and measured the radiographs including AP and lateral views of the extremity and they revealed moderate osteoarthritis of the knee with narrowing of the medial joint space.    Impression & Plan:   It is my impression this patient has osteoarthritis of her left knee.    I discussed the judicious use of nonsteroidal anti-inflammatory medications.  They should not take the medications if they have cardiac or cardiovascular risks without first checking with her primary care physician.  We discussed the risks versus benefits of anti-inflammatory medications.  The need to take the medications with food in order to decrease the risk of gastrointestinal complications.  The patient should inform their primary care physician that they are taking nonsteroidal anti-inflammatory medications so that their cardiac and cardiovascular systems, as well as monitor kidney and liver function appropriately.  They should not take these medications over an extended period of time.    I have stressed the importance of weight loss in order to decrease the progression of her osteoarthritis of her lower extremity weightbearing joint.  We discussed proper eating habits.    I would be delighted to see the patient back the future should  they  have further problems.    Note dictated with e(ye)BRAIN software.  Completed without full type editing and sent to avoid delay.

## 2024-07-02 DIAGNOSIS — F32.A DEPRESSION, UNSPECIFIED DEPRESSION TYPE: ICD-10-CM

## 2024-07-08 RX ORDER — VENLAFAXINE HYDROCHLORIDE 150 MG/1
150 CAPSULE, EXTENDED RELEASE ORAL NIGHTLY
Qty: 90 CAPSULE | Refills: 0 | Status: SHIPPED | OUTPATIENT
Start: 2024-07-08

## 2024-07-11 ENCOUNTER — OFFICE VISIT (OUTPATIENT)
Dept: ORTHOPEDIC SURGERY | Facility: CLINIC | Age: 59
End: 2024-07-11
Payer: MEDICAID

## 2024-07-11 ENCOUNTER — HOSPITAL ENCOUNTER (OUTPATIENT)
Dept: RADIOLOGY | Facility: CLINIC | Age: 59
Discharge: HOME | End: 2024-07-11
Payer: MEDICAID

## 2024-07-11 DIAGNOSIS — M25.532 LEFT WRIST PAIN: ICD-10-CM

## 2024-07-11 DIAGNOSIS — M18.12 ARTHRITIS OF CARPOMETACARPAL (CMC) JOINT OF LEFT THUMB: ICD-10-CM

## 2024-07-11 DIAGNOSIS — M19.032 ARTHRITIS OF LEFT WRIST: ICD-10-CM

## 2024-07-11 PROCEDURE — 73110 X-RAY EXAM OF WRIST: CPT | Mod: LT

## 2024-07-11 PROCEDURE — 99203 OFFICE O/P NEW LOW 30 MIN: CPT | Performed by: FAMILY MEDICINE

## 2024-07-11 PROCEDURE — 1036F TOBACCO NON-USER: CPT | Performed by: FAMILY MEDICINE

## 2024-07-11 NOTE — PROGRESS NOTES
History of Present Illness   Chief Complaint   Patient presents with    Left Wrist - Pain     L WRIST PAIN -MEDIAL ASPECT- X 1 YEAR NKI  HX OF BL CTS 15-20 YEARS AGO  HX OF CERVICAL RADICULOPATHY SEEN W/ DR. GAONA 1/16/24       The patient is 59 y.o. female  here with a complaint of left wrist pain.  Atraumatic onset of symptoms approximate 1 year ago.  Patient points to the ulnar aspect of her wrist as area of discomfort.  Patient works as a pharmacy tech, says that she will get pain with work-related activities at times.  At rest she has minimal discomfort.  She feels there is swelling on occasion in her wrist.  She denies any consistent numbness or tingling.  Patient is has history of carpal tunnel, says that she has an old wrist brace that she is wearing at night because she says at times that she will wake up with her wrist in a contorted position and thought this brace would be of benefit but has not noticed any difference.  She takes over-the-counter medications occasionally for wrist pain as well as other body aches and pains.    Past Medical History:   Diagnosis Date    Abnormal uterine bleeding 08/06/2015    Depression, unspecified 08/06/2014    Depression    Hypothyroidism, unspecified 06/11/2014    Hypothyroidism    Personal history of diseases of the blood and blood-forming organs and certain disorders involving the immune mechanism 06/11/2014    History of anemia       Medication Documentation Review Audit       Reviewed by Garrick Ku MA (Medical Assistant) on 07/11/24 at 1419      Medication Order Taking? Sig Documenting Provider Last Dose Status   albuterol 90 mcg/actuation aerosol powdr breath activated inhaler [id number] No Inhale. Historical Provider, MD Taking Active   atorvastatin (Lipitor) 10 mg tablet [id number]  Take by mouth. Historical Provider, MD  Active   chlorthalidone (Hygroton) 25 mg tablet [id number]  Take 1/2 (one-half) tablet by mouth once daily Renetta Coffman  MD  Active   ferrous sulfate, dried (Slow Release Iron) 160 mg (50 mg iron) ER tablet [id number] No Take by mouth. Historical Provider, MD Taking Active   levothyroxine (Synthroid, Levoxyl) 100 mcg tablet [id number]  Take 1 tablet by mouth once daily Renetta Coffman MD  Active   levothyroxine (Synthroid, Levoxyl) 100 mcg tablet [id number]  Take 1 tablet (100 mcg) by mouth once daily. Renetta Coffman MD  Active   metoprolol succinate XL (Toprol-XL) 100 mg 24 hr tablet [id number]  Take 1 tablet by mouth once daily Renetta Coffman MD  Active     Discontinued 24 1309   venlafaxine XR (Effexor-XR) 150 mg 24 hr capsule [id number]  Take 1 capsule by mouth at bedtime Renetta Coffman MD  Active                    Allergies   Allergen Reactions    Animal Dander Unknown    Egg Unknown       Social History     Socioeconomic History    Marital status:      Spouse name: Not on file    Number of children: Not on file    Years of education: Not on file    Highest education level: Not on file   Occupational History    Not on file   Tobacco Use    Smoking status: Never    Smokeless tobacco: Never   Substance and Sexual Activity    Alcohol use: Yes    Drug use: Never    Sexual activity: Not on file   Other Topics Concern    Not on file   Social History Narrative    Not on file     Social Determinants of Health     Financial Resource Strain: Not on file   Food Insecurity: Not on file   Transportation Needs: Not on file   Physical Activity: Not on file   Stress: Not on file   Social Connections: Not on file   Intimate Partner Violence: Not on file   Housing Stability: Not on file       Past Surgical History:   Procedure Laterality Date    CARPAL TUNNEL RELEASE  2014    Neuroplasty Decompression Median Nerve At Carpal Tunnel     SECTION, CLASSIC  2014     Section    TONSILLECTOMY  2014    Tonsillectomy          Review of Systems   GENERAL: Negative  GI:  Negative  MUSCULOSKELETAL: See HPI  SKIN: Negative  NEURO:  Negative     Physical Exam:    General/Constitutional: well appearing, no distress, appears stated age  HEENT: sclera clear  Respiratory: non labored breathing  Vascular: No edema, swelling or tenderness, except as noted in detailed exam.  Integumentary: No impressive skin lesions present, except as noted in detailed exam.  Neurological:  Alert and oriented   Psychological:  Normal mood and affect.  Musculoskeletal: Normal, except as noted in detailed exam and in HPI    Left wrist: Normal appearance, no skin changes, no redness or warmth.  She is tender outpatient at the ulnar aspect of the wrist joint near the area of the TFCC, no significant tenderness at the distal ulna, nontender at the radiocarpal joint or distal radius or proximal carpus.  She has relatively preserved range of motion at the wrist in all directions.  She has pain with ulnar deviation.  No significant motor deficits present at the wrist, no pain with strength testing.  There is pain with TFCC grind.  Positive fovea sign.  At the hand there is no motor or sensory deficits the median, ulnar, radial nerve distribution.       Imaging: X-rays of left wrist obtained today and independently reviewed, there is some cystic change of the distal ulna suggestive of arthritic changes in the ulnar aspect of the wrist joint.  There is also degenerative changes of the first CMC joint which is moderate in severity      Assessment   1. Arthritis of left wrist  Referral to Occupational Therapy      2. Arthritis of carpometacarpal (CMC) joint of left thumb  Referral to Occupational Therapy      3. Left wrist pain  XR wrist left 3+ views            Plan: Discussed diagnosis, reviewed x-rays, treatment options with patient.  We discussed role of conservative management including wrist bracing, may benefit from lower profile brace to allow her to be more active at work, we discussed consideration of  occupational therapy, we discussed injections.  Patient was interested in trial of occupational therapy and bracing.  Referral for occupational therapy was placed for her wrist as well as for her thumb that she did mention some pain in her first CMC joint.  Patient will follow-up as symptoms dictate

## 2024-08-08 DIAGNOSIS — I10 BENIGN DIASTOLIC HYPERTENSION: ICD-10-CM

## 2024-08-08 RX ORDER — CHLORTHALIDONE 25 MG/1
25 TABLET ORAL DAILY
Qty: 45 TABLET | Refills: 0 | Status: SHIPPED | OUTPATIENT
Start: 2024-08-08

## 2024-08-20 ENCOUNTER — EVALUATION (OUTPATIENT)
Dept: OCCUPATIONAL THERAPY | Facility: CLINIC | Age: 59
End: 2024-08-20
Payer: MEDICAID

## 2024-08-20 DIAGNOSIS — M25.532 LEFT WRIST PAIN: Primary | ICD-10-CM

## 2024-08-20 DIAGNOSIS — M18.12 ARTHRITIS OF CARPOMETACARPAL (CMC) JOINT OF LEFT THUMB: ICD-10-CM

## 2024-08-20 DIAGNOSIS — M19.032 ARTHRITIS OF LEFT WRIST: ICD-10-CM

## 2024-08-20 PROBLEM — M25.539 WRIST PAIN: Status: ACTIVE | Noted: 2024-08-20

## 2024-08-20 PROCEDURE — 97110 THERAPEUTIC EXERCISES: CPT | Mod: GO

## 2024-08-20 PROCEDURE — 97165 OT EVAL LOW COMPLEX 30 MIN: CPT | Mod: GO

## 2024-08-20 NOTE — PROGRESS NOTES
"  Occupational Therapy Orthopedic Evaluation    Patient Name: Thuy Elizondo  MRN: [id number]  Today's Date: 8/20/2024  Time Calculation  Start Time: 1230  Stop Time: 1315  Time Calculation (min): 45 min  Insurance:  Visit number: 1  Insurance Type: [employer]  Medicaid   Authorization info: 30 visits then auth    General:  Reason for visit: Left wrist pain  Referred by: Dr. Saúl Lozoya MD    Current Problem  1. Left wrist pain  Follow Up In Occupational Therapy      2. Arthritis of left wrist  Referral to Occupational Therapy      3. Arthritis of carpometacarpal (CMC) joint of left thumb  Referral to Occupational Therapy        Precautions:    Medical History Form: Reviewed (scanned into chart)  Diagnoses pertinent to therapy: Arthritis , HTN     SUBJECTIVE:   DARRELL: insidious   Date of onset: October 2023   Date of surgery: NA  Chief complaints/concerns from patient/family member: Increased pain and difficulty with fxal lifting at work, with yardwork, gardening . Turning tractor, opening objects.    Hand Dominance: Right     Pain:   Location: Left ulnar wrist   At rest :  0 /10            Fxal use/movement:  6 /10  Description/Type: Soreness , intermittent sharp   Aggravating Factors: ROM , lifting   Relieving Factors: Rest    Relevant Information (PMH & Previous Tests/Imaging): Xray   Previous Interventions/Treatments: None    Prior Level of Function (PLOF)  Previous ADL/IADL Status:  Independent   Work/School: [employer], part time  Leisure/Hobbies: gardening , yard work    Patients Living Environment: Reviewed and no concern  Primary Language: English    Pt goals for therapy: \"Do normal stuff without pain.\"     OBJECTIVE:     ROM:  Elbow/Forearm AROM (Degrees of motion)    R L   Extension NE NE   Flexion NE NE   Pronation WFL  71 tightness reports    Supination  62      Wrist AROM  (Degrees of motion)    R L   Extension 65 51   Flexion 80 73   Radial Deviation NE 11   Ulnar Deviation NE 16    "   Composite fist/digit AROM: WFL/WNL  Thumb AROM: WFL/WNL    Hand Strength Measures: LBS   R L   Dynamometer  50 38   Lateral Pinch 11 9   3 PT Pinch  Tip Pinch 8  5 6.5  6      MMT UE: Forearm 4/5    Wrist flexion 4-/5   Wrist extension 4-/5     Physical Observation:   Edema: mild edema observed at ulnar wrist/enlarged styloid  Paresthesias: No complaints   Scar/Incision: NA  Coordination: Nine Hole peg test: NE    Quick Dash Outcome Measurement:   34.09 %    Red Flags: Do you have any of the following? No  Fever/chills, unexplained weight changes, dizziness/fainting, unexplained change in bowel or bladder functions, unexplained malaise or muscle weakness, night pain/sweats, numbness or tingling    Treatment:   OT evaluation completed and HEP issued.   TE:  Patient fitted and issued a tubigrip E sleeve x 4 , wear PRN, at work , at night as needed.Wear, care and precautions instructed to patient. Option for a prefab wrist support.  Patient education on rationale, benefits and timing of MH, warm soaks and CP use to decrease pain and symptoms.  Wrist flexion/extension table PROM/AAROM ex  Pink foam block for light  x 10     Patient verbalizes and demonstrates good understanding,technique and precautions with above.  Written and illustrated handouts issued to patient.     ASSESSMENT:   Patient is a 59 y.o. female with the diagnosis of Left wrist pain  resulting in limited ability and participation in ADLs, IADLS, work and leisure activities.. Pt demonstrating increased wrist pain, decreased ROM and strength of wrist and hand. Pt would benefit from skilled Occupational Therapy to address the above deficits in order to return to functional activities as able with increased independence.     PLAN:  Goals:    Active       OT Goals       Patient to be independent with HEP and conservative management techs to  further fxal progress and ability.         Start:  08/20/24    Expected End:  10/19/24            Patient to  improve  AROM in supination, wrist flex/extension by 5 degrees for increase ease with ADLS and fxal activities.         Start:  08/20/24    Expected End:  10/19/24            Patient to increase m. strength to 4+/5  for increased ease ability for household and work related lift/carry tasks.         Start:  08/20/24    Expected End:  10/19/24            Patient to use left arm/hand for work duties with increased ease and ability by 25% or better.        Start:  08/20/24    Expected End:  10/19/24               Planned Interventions include: therapeutic exercise, therapeutic activity, self-care home management, manual therapy, neuromuscular education , electric stimulation, fluidotherapy, ultrasound, Home exercise program, orthosis fabrication.    Frequency: 1 x week, every other  Duration: 4 weeks    Rehab Potential: Good   Plan of care was developed with input and agreement by the patient.     Leesa Ott MS, OTR/L [id number]

## 2024-09-05 DIAGNOSIS — E03.9 HYPOTHYROIDISM, UNSPECIFIED TYPE: ICD-10-CM

## 2024-09-05 RX ORDER — LEVOTHYROXINE SODIUM 100 UG/1
100 TABLET ORAL DAILY
Qty: 90 TABLET | Refills: 0 | Status: SHIPPED | OUTPATIENT
Start: 2024-09-05

## 2024-09-10 ENCOUNTER — APPOINTMENT (OUTPATIENT)
Dept: OCCUPATIONAL THERAPY | Facility: CLINIC | Age: 59
End: 2024-09-10
Payer: MEDICAID

## 2024-09-15 PROCEDURE — 99285 EMERGENCY DEPT VISIT HI MDM: CPT | Mod: 25

## 2024-09-15 ASSESSMENT — PAIN DESCRIPTION - PROGRESSION: CLINICAL_PROGRESSION: GRADUALLY WORSENING

## 2024-09-15 ASSESSMENT — COLUMBIA-SUICIDE SEVERITY RATING SCALE - C-SSRS
2. HAVE YOU ACTUALLY HAD ANY THOUGHTS OF KILLING YOURSELF?: NO
6. HAVE YOU EVER DONE ANYTHING, STARTED TO DO ANYTHING, OR PREPARED TO DO ANYTHING TO END YOUR LIFE?: NO
1. IN THE PAST MONTH, HAVE YOU WISHED YOU WERE DEAD OR WISHED YOU COULD GO TO SLEEP AND NOT WAKE UP?: NO

## 2024-09-15 ASSESSMENT — PAIN DESCRIPTION - FREQUENCY: FREQUENCY: CONSTANT/CONTINUOUS

## 2024-09-15 ASSESSMENT — PAIN - FUNCTIONAL ASSESSMENT: PAIN_FUNCTIONAL_ASSESSMENT: 0-10

## 2024-09-15 ASSESSMENT — PAIN DESCRIPTION - LOCATION: LOCATION: ABDOMEN

## 2024-09-15 ASSESSMENT — PAIN DESCRIPTION - DESCRIPTORS
DESCRIPTORS: OTHER (COMMENT);BURNING
DESCRIPTORS: BURNING;OTHER (COMMENT)

## 2024-09-15 ASSESSMENT — PAIN SCALES - GENERAL: PAINLEVEL_OUTOF10: 6

## 2024-09-15 ASSESSMENT — PAIN DESCRIPTION - PAIN TYPE: TYPE: ACUTE PAIN

## 2024-09-15 ASSESSMENT — PAIN DESCRIPTION - ORIENTATION: ORIENTATION: RIGHT;UPPER

## 2024-09-15 ASSESSMENT — PAIN DESCRIPTION - ONSET: ONSET: ONGOING

## 2024-09-16 ENCOUNTER — APPOINTMENT (OUTPATIENT)
Dept: RADIOLOGY | Facility: HOSPITAL | Age: 59
End: 2024-09-16
Payer: MEDICAID

## 2024-09-16 ENCOUNTER — ANESTHESIA EVENT (OUTPATIENT)
Dept: OPERATING ROOM | Facility: HOSPITAL | Age: 59
End: 2024-09-16
Payer: MEDICAID

## 2024-09-16 ENCOUNTER — APPOINTMENT (OUTPATIENT)
Dept: CARDIOLOGY | Facility: HOSPITAL | Age: 59
End: 2024-09-16
Payer: MEDICAID

## 2024-09-16 ENCOUNTER — ANESTHESIA (OUTPATIENT)
Dept: OPERATING ROOM | Facility: HOSPITAL | Age: 59
End: 2024-09-16
Payer: MEDICAID

## 2024-09-16 ENCOUNTER — HOSPITAL ENCOUNTER (OUTPATIENT)
Facility: HOSPITAL | Age: 59
Setting detail: OBSERVATION
Discharge: HOME | End: 2024-09-17
Attending: EMERGENCY MEDICINE | Admitting: FAMILY MEDICINE
Payer: MEDICAID

## 2024-09-16 DIAGNOSIS — K80.20 SYMPTOMATIC CHOLELITHIASIS: ICD-10-CM

## 2024-09-16 DIAGNOSIS — G89.18 ACUTE POST-OPERATIVE PAIN: ICD-10-CM

## 2024-09-16 DIAGNOSIS — I10 BENIGN DIASTOLIC HYPERTENSION: ICD-10-CM

## 2024-09-16 DIAGNOSIS — R10.11 RIGHT UPPER QUADRANT ABDOMINAL PAIN: Primary | ICD-10-CM

## 2024-09-16 DIAGNOSIS — R03.0 ELEVATED BLOOD PRESSURE READING: ICD-10-CM

## 2024-09-16 DIAGNOSIS — K83.1 OBSTRUCTION OF BILE DUCT (CMS-HCC): ICD-10-CM

## 2024-09-16 PROBLEM — R79.89 ELEVATED LIVER FUNCTION TESTS: Status: ACTIVE | Noted: 2024-09-16

## 2024-09-16 PROBLEM — J45.909 ASTHMA: Status: ACTIVE | Noted: 2024-09-16

## 2024-09-16 PROBLEM — R11.2 PONV (POSTOPERATIVE NAUSEA AND VOMITING): Status: ACTIVE | Noted: 2024-09-16

## 2024-09-16 PROBLEM — J18.9 PNEUMONIA: Status: ACTIVE | Noted: 2024-09-16

## 2024-09-16 PROBLEM — E66.9 OBESITY: Status: ACTIVE | Noted: 2024-09-16

## 2024-09-16 PROBLEM — Z98.890 PONV (POSTOPERATIVE NAUSEA AND VOMITING): Status: ACTIVE | Noted: 2024-09-16

## 2024-09-16 LAB
ALBUMIN SERPL BCP-MCNC: 4.3 G/DL (ref 3.4–5)
ALP SERPL-CCNC: 123 U/L (ref 33–110)
ALT SERPL W P-5'-P-CCNC: 441 U/L (ref 7–45)
ANION GAP SERPL CALC-SCNC: 13 MMOL/L (ref 10–20)
AST SERPL W P-5'-P-CCNC: 91 U/L (ref 9–39)
ATRIAL RATE: 69 BPM
BASOPHILS # BLD AUTO: 0.01 X10*3/UL (ref 0–0.1)
BASOPHILS NFR BLD AUTO: 0.1 %
BILIRUB SERPL-MCNC: 0.5 MG/DL (ref 0–1.2)
BUN SERPL-MCNC: 15 MG/DL (ref 6–23)
CALCIUM SERPL-MCNC: 9.2 MG/DL (ref 8.6–10.3)
CARDIAC TROPONIN I PNL SERPL HS: 4 NG/L (ref 0–13)
CHLORIDE SERPL-SCNC: 101 MMOL/L (ref 98–107)
CO2 SERPL-SCNC: 29 MMOL/L (ref 21–32)
CREAT SERPL-MCNC: 0.64 MG/DL (ref 0.5–1.05)
EGFRCR SERPLBLD CKD-EPI 2021: >90 ML/MIN/1.73M*2
EOSINOPHIL # BLD AUTO: 0 X10*3/UL (ref 0–0.7)
EOSINOPHIL NFR BLD AUTO: 0 %
ERYTHROCYTE [DISTWIDTH] IN BLOOD BY AUTOMATED COUNT: 13.4 % (ref 11.5–14.5)
GLUCOSE SERPL-MCNC: 127 MG/DL (ref 74–99)
HCT VFR BLD AUTO: 42.5 % (ref 36–46)
HGB BLD-MCNC: 13.5 G/DL (ref 12–16)
IMM GRANULOCYTES # BLD AUTO: 0.04 X10*3/UL (ref 0–0.7)
IMM GRANULOCYTES NFR BLD AUTO: 0.5 % (ref 0–0.9)
LACTATE SERPL-SCNC: 1.3 MMOL/L (ref 0.4–2)
LIPASE SERPL-CCNC: 49 U/L (ref 9–82)
LYMPHOCYTES # BLD AUTO: 1.71 X10*3/UL (ref 1.2–4.8)
LYMPHOCYTES NFR BLD AUTO: 21.5 %
MAGNESIUM SERPL-MCNC: 2.3 MG/DL (ref 1.6–2.4)
MCH RBC QN AUTO: 28.8 PG (ref 26–34)
MCHC RBC AUTO-ENTMCNC: 31.8 G/DL (ref 32–36)
MCV RBC AUTO: 91 FL (ref 80–100)
MONOCYTES # BLD AUTO: 0.48 X10*3/UL (ref 0.1–1)
MONOCYTES NFR BLD AUTO: 6 %
NEUTROPHILS # BLD AUTO: 5.7 X10*3/UL (ref 1.2–7.7)
NEUTROPHILS NFR BLD AUTO: 71.9 %
NRBC BLD-RTO: 0 /100 WBCS (ref 0–0)
P AXIS: 51 DEGREES
P OFFSET: 205 MS
P ONSET: 152 MS
PLATELET # BLD AUTO: 232 X10*3/UL (ref 150–450)
POTASSIUM SERPL-SCNC: 4 MMOL/L (ref 3.5–5.3)
PR INTERVAL: 144 MS
PROT SERPL-MCNC: 7.1 G/DL (ref 6.4–8.2)
Q ONSET: 224 MS
QRS COUNT: 12 BEATS
QRS DURATION: 64 MS
QT INTERVAL: 394 MS
QTC CALCULATION(BAZETT): 422 MS
QTC FREDERICIA: 413 MS
R AXIS: 71 DEGREES
RBC # BLD AUTO: 4.69 X10*6/UL (ref 4–5.2)
SODIUM SERPL-SCNC: 139 MMOL/L (ref 136–145)
T AXIS: 30 DEGREES
T OFFSET: 421 MS
VENTRICULAR RATE: 69 BPM
WBC # BLD AUTO: 7.9 X10*3/UL (ref 4.4–11.3)

## 2024-09-16 PROCEDURE — G0378 HOSPITAL OBSERVATION PER HR: HCPCS

## 2024-09-16 PROCEDURE — 2550000001 HC RX 255 CONTRASTS: Performed by: SURGERY

## 2024-09-16 PROCEDURE — 2500000004 HC RX 250 GENERAL PHARMACY W/ HCPCS (ALT 636 FOR OP/ED): Performed by: ANESTHESIOLOGIST ASSISTANT

## 2024-09-16 PROCEDURE — 80053 COMPREHEN METABOLIC PANEL: CPT | Performed by: EMERGENCY MEDICINE

## 2024-09-16 PROCEDURE — 7100000002 HC RECOVERY ROOM TIME - EACH INCREMENTAL 1 MINUTE: Performed by: SURGERY

## 2024-09-16 PROCEDURE — 93005 ELECTROCARDIOGRAM TRACING: CPT

## 2024-09-16 PROCEDURE — 88304 TISSUE EXAM BY PATHOLOGIST: CPT | Mod: TC,AHULAB

## 2024-09-16 PROCEDURE — 3600000003 HC OR TIME - INITIAL BASE CHARGE - PROCEDURE LEVEL THREE: Performed by: SURGERY

## 2024-09-16 PROCEDURE — 3600000008 HC OR TIME - EACH INCREMENTAL 1 MINUTE - PROCEDURE LEVEL THREE: Performed by: SURGERY

## 2024-09-16 PROCEDURE — 76705 ECHO EXAM OF ABDOMEN: CPT

## 2024-09-16 PROCEDURE — 47563 LAPARO CHOLECYSTECTOMY/GRAPH: CPT | Performed by: SURGERY

## 2024-09-16 PROCEDURE — 99221 1ST HOSP IP/OBS SF/LOW 40: CPT | Performed by: SURGERY

## 2024-09-16 PROCEDURE — 96361 HYDRATE IV INFUSION ADD-ON: CPT

## 2024-09-16 PROCEDURE — 3700000001 HC GENERAL ANESTHESIA TIME - INITIAL BASE CHARGE: Performed by: SURGERY

## 2024-09-16 PROCEDURE — 2500000004 HC RX 250 GENERAL PHARMACY W/ HCPCS (ALT 636 FOR OP/ED): Performed by: FAMILY MEDICINE

## 2024-09-16 PROCEDURE — 2500000004 HC RX 250 GENERAL PHARMACY W/ HCPCS (ALT 636 FOR OP/ED)

## 2024-09-16 PROCEDURE — 7100000001 HC RECOVERY ROOM TIME - INITIAL BASE CHARGE: Performed by: SURGERY

## 2024-09-16 PROCEDURE — 2500000005 HC RX 250 GENERAL PHARMACY W/O HCPCS: Performed by: ANESTHESIOLOGY

## 2024-09-16 PROCEDURE — 2500000004 HC RX 250 GENERAL PHARMACY W/ HCPCS (ALT 636 FOR OP/ED): Performed by: ANESTHESIOLOGY

## 2024-09-16 PROCEDURE — 99254 IP/OBS CNSLTJ NEW/EST MOD 60: CPT

## 2024-09-16 PROCEDURE — A47563 PR LAP,CHOLECYSTECTOMY/GRAPH: Performed by: ANESTHESIOLOGIST ASSISTANT

## 2024-09-16 PROCEDURE — 96361 HYDRATE IV INFUSION ADD-ON: CPT | Mod: 59

## 2024-09-16 PROCEDURE — 2720000007 HC OR 272 NO HCPCS: Performed by: SURGERY

## 2024-09-16 PROCEDURE — 2500000001 HC RX 250 WO HCPCS SELF ADMINISTERED DRUGS (ALT 637 FOR MEDICARE OP): Performed by: SURGERY

## 2024-09-16 PROCEDURE — 83605 ASSAY OF LACTIC ACID: CPT | Performed by: EMERGENCY MEDICINE

## 2024-09-16 PROCEDURE — A47563 PR LAP,CHOLECYSTECTOMY/GRAPH: Performed by: ANESTHESIOLOGY

## 2024-09-16 PROCEDURE — 96376 TX/PRO/DX INJ SAME DRUG ADON: CPT | Mod: 59

## 2024-09-16 PROCEDURE — 2500000004 HC RX 250 GENERAL PHARMACY W/ HCPCS (ALT 636 FOR OP/ED): Performed by: SURGERY

## 2024-09-16 PROCEDURE — 83735 ASSAY OF MAGNESIUM: CPT | Performed by: EMERGENCY MEDICINE

## 2024-09-16 PROCEDURE — 96374 THER/PROPH/DIAG INJ IV PUSH: CPT

## 2024-09-16 PROCEDURE — 96375 TX/PRO/DX INJ NEW DRUG ADDON: CPT | Mod: 59

## 2024-09-16 PROCEDURE — 96375 TX/PRO/DX INJ NEW DRUG ADDON: CPT

## 2024-09-16 PROCEDURE — 74300 X-RAY BILE DUCTS/PANCREAS: CPT

## 2024-09-16 PROCEDURE — 2780000003 HC OR 278 NO HCPCS: Performed by: SURGERY

## 2024-09-16 PROCEDURE — 96374 THER/PROPH/DIAG INJ IV PUSH: CPT | Mod: 59

## 2024-09-16 PROCEDURE — 84484 ASSAY OF TROPONIN QUANT: CPT

## 2024-09-16 PROCEDURE — 83690 ASSAY OF LIPASE: CPT | Performed by: EMERGENCY MEDICINE

## 2024-09-16 PROCEDURE — 2500000005 HC RX 250 GENERAL PHARMACY W/O HCPCS: Performed by: SURGERY

## 2024-09-16 PROCEDURE — 1100000001 HC PRIVATE ROOM DAILY

## 2024-09-16 PROCEDURE — 85025 COMPLETE CBC W/AUTO DIFF WBC: CPT | Performed by: EMERGENCY MEDICINE

## 2024-09-16 PROCEDURE — 76705 ECHO EXAM OF ABDOMEN: CPT | Mod: FOREIGN READ | Performed by: RADIOLOGY

## 2024-09-16 PROCEDURE — 36415 COLL VENOUS BLD VENIPUNCTURE: CPT | Performed by: EMERGENCY MEDICINE

## 2024-09-16 PROCEDURE — 3700000002 HC GENERAL ANESTHESIA TIME - EACH INCREMENTAL 1 MINUTE: Performed by: SURGERY

## 2024-09-16 PROCEDURE — 2500000005 HC RX 250 GENERAL PHARMACY W/O HCPCS: Performed by: ANESTHESIOLOGIST ASSISTANT

## 2024-09-16 RX ORDER — OXYCODONE HYDROCHLORIDE 5 MG/1
5 TABLET ORAL EVERY 4 HOURS PRN
Status: DISCONTINUED | OUTPATIENT
Start: 2024-09-16 | End: 2024-09-16 | Stop reason: HOSPADM

## 2024-09-16 RX ORDER — ACETAMINOPHEN 650 MG/1
650 SUPPOSITORY RECTAL EVERY 4 HOURS PRN
Status: DISCONTINUED | OUTPATIENT
Start: 2024-09-16 | End: 2024-09-16

## 2024-09-16 RX ORDER — ACETAMINOPHEN 160 MG/5ML
650 SOLUTION ORAL EVERY 4 HOURS PRN
Status: DISCONTINUED | OUTPATIENT
Start: 2024-09-16 | End: 2024-09-17 | Stop reason: HOSPADM

## 2024-09-16 RX ORDER — OXYCODONE AND ACETAMINOPHEN 5; 325 MG/1; MG/1
1 TABLET ORAL EVERY 4 HOURS PRN
Status: DISCONTINUED | OUTPATIENT
Start: 2024-09-16 | End: 2024-09-17 | Stop reason: HOSPADM

## 2024-09-16 RX ORDER — PROMETHAZINE HYDROCHLORIDE 25 MG/ML
6.25 INJECTION, SOLUTION INTRAMUSCULAR; INTRAVENOUS ONCE AS NEEDED
Status: DISCONTINUED | OUTPATIENT
Start: 2024-09-16 | End: 2024-09-16 | Stop reason: HOSPADM

## 2024-09-16 RX ORDER — PROCHLORPERAZINE MALEATE 10 MG
10 TABLET ORAL EVERY 6 HOURS PRN
Status: DISCONTINUED | OUTPATIENT
Start: 2024-09-16 | End: 2024-09-17 | Stop reason: HOSPADM

## 2024-09-16 RX ORDER — BUPIVACAINE HYDROCHLORIDE 5 MG/ML
INJECTION, SOLUTION EPIDURAL; INTRACAUDAL AS NEEDED
Status: DISCONTINUED | OUTPATIENT
Start: 2024-09-16 | End: 2024-09-16 | Stop reason: HOSPADM

## 2024-09-16 RX ORDER — SODIUM CHLORIDE, SODIUM LACTATE, POTASSIUM CHLORIDE, CALCIUM CHLORIDE 600; 310; 30; 20 MG/100ML; MG/100ML; MG/100ML; MG/100ML
20 INJECTION, SOLUTION INTRAVENOUS CONTINUOUS
Status: DISCONTINUED | OUTPATIENT
Start: 2024-09-16 | End: 2024-09-16

## 2024-09-16 RX ORDER — ALBUTEROL SULFATE 0.83 MG/ML
2.5 SOLUTION RESPIRATORY (INHALATION) EVERY 2 HOUR PRN
Status: DISCONTINUED | OUTPATIENT
Start: 2024-09-16 | End: 2024-09-17 | Stop reason: HOSPADM

## 2024-09-16 RX ORDER — PROPOFOL 10 MG/ML
INJECTION, EMULSION INTRAVENOUS AS NEEDED
Status: DISCONTINUED | OUTPATIENT
Start: 2024-09-16 | End: 2024-09-16

## 2024-09-16 RX ORDER — CEFAZOLIN 1 G/1
INJECTION, POWDER, FOR SOLUTION INTRAVENOUS AS NEEDED
Status: DISCONTINUED | OUTPATIENT
Start: 2024-09-16 | End: 2024-09-16

## 2024-09-16 RX ORDER — ONDANSETRON HYDROCHLORIDE 2 MG/ML
INJECTION, SOLUTION INTRAVENOUS AS NEEDED
Status: DISCONTINUED | OUTPATIENT
Start: 2024-09-16 | End: 2024-09-16

## 2024-09-16 RX ORDER — ONDANSETRON HYDROCHLORIDE 2 MG/ML
4 INJECTION, SOLUTION INTRAVENOUS EVERY 8 HOURS PRN
Status: DISCONTINUED | OUTPATIENT
Start: 2024-09-16 | End: 2024-09-17 | Stop reason: HOSPADM

## 2024-09-16 RX ORDER — POLYETHYLENE GLYCOL 3350 17 G/17G
17 POWDER, FOR SOLUTION ORAL DAILY
Status: DISCONTINUED | OUTPATIENT
Start: 2024-09-16 | End: 2024-09-17 | Stop reason: HOSPADM

## 2024-09-16 RX ORDER — FENTANYL CITRATE 50 UG/ML
INJECTION, SOLUTION INTRAMUSCULAR; INTRAVENOUS AS NEEDED
Status: DISCONTINUED | OUTPATIENT
Start: 2024-09-16 | End: 2024-09-16

## 2024-09-16 RX ORDER — LIDOCAINE HYDROCHLORIDE 10 MG/ML
0.1 INJECTION, SOLUTION EPIDURAL; INFILTRATION; INTRACAUDAL; PERINEURAL ONCE
Status: DISCONTINUED | OUTPATIENT
Start: 2024-09-16 | End: 2024-09-16 | Stop reason: HOSPADM

## 2024-09-16 RX ORDER — METOPROLOL SUCCINATE 50 MG/1
100 TABLET, EXTENDED RELEASE ORAL DAILY
Status: DISCONTINUED | OUTPATIENT
Start: 2024-09-16 | End: 2024-09-17 | Stop reason: HOSPADM

## 2024-09-16 RX ORDER — GUAIFENESIN 600 MG/1
600 TABLET, EXTENDED RELEASE ORAL EVERY 12 HOURS PRN
Status: DISCONTINUED | OUTPATIENT
Start: 2024-09-16 | End: 2024-09-17 | Stop reason: HOSPADM

## 2024-09-16 RX ORDER — MEPERIDINE HYDROCHLORIDE 25 MG/ML
12.5 INJECTION INTRAMUSCULAR; INTRAVENOUS; SUBCUTANEOUS EVERY 10 MIN PRN
Status: DISCONTINUED | OUTPATIENT
Start: 2024-09-16 | End: 2024-09-16 | Stop reason: HOSPADM

## 2024-09-16 RX ORDER — MIDAZOLAM HYDROCHLORIDE 1 MG/ML
INJECTION INTRAMUSCULAR; INTRAVENOUS AS NEEDED
Status: DISCONTINUED | OUTPATIENT
Start: 2024-09-16 | End: 2024-09-16

## 2024-09-16 RX ORDER — FAMOTIDINE 10 MG/ML
20 INJECTION INTRAVENOUS 2 TIMES DAILY
Status: DISCONTINUED | OUTPATIENT
Start: 2024-09-16 | End: 2024-09-17 | Stop reason: HOSPADM

## 2024-09-16 RX ORDER — SODIUM CHLORIDE, SODIUM LACTATE, POTASSIUM CHLORIDE, CALCIUM CHLORIDE 600; 310; 30; 20 MG/100ML; MG/100ML; MG/100ML; MG/100ML
100 INJECTION, SOLUTION INTRAVENOUS CONTINUOUS
Status: DISCONTINUED | OUTPATIENT
Start: 2024-09-16 | End: 2024-09-16 | Stop reason: HOSPADM

## 2024-09-16 RX ORDER — SODIUM CHLORIDE 0.9 G/100ML
IRRIGANT IRRIGATION AS NEEDED
Status: DISCONTINUED | OUTPATIENT
Start: 2024-09-16 | End: 2024-09-16 | Stop reason: HOSPADM

## 2024-09-16 RX ORDER — FAMOTIDINE 20 MG/1
20 TABLET, FILM COATED ORAL 2 TIMES DAILY
Status: DISCONTINUED | OUTPATIENT
Start: 2024-09-16 | End: 2024-09-17 | Stop reason: HOSPADM

## 2024-09-16 RX ORDER — PROCHLORPERAZINE EDISYLATE 5 MG/ML
10 INJECTION INTRAMUSCULAR; INTRAVENOUS EVERY 6 HOURS PRN
Status: DISCONTINUED | OUTPATIENT
Start: 2024-09-16 | End: 2024-09-17 | Stop reason: HOSPADM

## 2024-09-16 RX ORDER — ONDANSETRON 4 MG/1
4 TABLET, FILM COATED ORAL EVERY 8 HOURS PRN
Status: DISCONTINUED | OUTPATIENT
Start: 2024-09-16 | End: 2024-09-17 | Stop reason: HOSPADM

## 2024-09-16 RX ORDER — PROCHLORPERAZINE 25 MG/1
25 SUPPOSITORY RECTAL EVERY 12 HOURS PRN
Status: DISCONTINUED | OUTPATIENT
Start: 2024-09-16 | End: 2024-09-16

## 2024-09-16 RX ORDER — HEPARIN SODIUM 5000 [USP'U]/ML
5000 INJECTION, SOLUTION INTRAVENOUS; SUBCUTANEOUS EVERY 8 HOURS SCHEDULED
Status: DISCONTINUED | OUTPATIENT
Start: 2024-09-16 | End: 2024-09-17 | Stop reason: HOSPADM

## 2024-09-16 RX ORDER — ACETAMINOPHEN 160 MG/5ML
650 SOLUTION ORAL EVERY 4 HOURS PRN
Status: DISCONTINUED | OUTPATIENT
Start: 2024-09-16 | End: 2024-09-16

## 2024-09-16 RX ORDER — ONDANSETRON HYDROCHLORIDE 2 MG/ML
4 INJECTION, SOLUTION INTRAVENOUS ONCE AS NEEDED
Status: DISCONTINUED | OUTPATIENT
Start: 2024-09-16 | End: 2024-09-16

## 2024-09-16 RX ORDER — ONDANSETRON HYDROCHLORIDE 2 MG/ML
4 INJECTION, SOLUTION INTRAVENOUS ONCE
Status: COMPLETED | OUTPATIENT
Start: 2024-09-16 | End: 2024-09-16

## 2024-09-16 RX ORDER — ACETAMINOPHEN 325 MG/1
650 TABLET ORAL EVERY 4 HOURS PRN
Status: DISCONTINUED | OUTPATIENT
Start: 2024-09-16 | End: 2024-09-17 | Stop reason: HOSPADM

## 2024-09-16 RX ORDER — SENNOSIDES 8.6 MG/1
2 TABLET ORAL 2 TIMES DAILY
Status: DISCONTINUED | OUTPATIENT
Start: 2024-09-16 | End: 2024-09-17 | Stop reason: HOSPADM

## 2024-09-16 RX ORDER — VENLAFAXINE HYDROCHLORIDE 75 MG/1
150 CAPSULE, EXTENDED RELEASE ORAL NIGHTLY
Status: DISCONTINUED | OUTPATIENT
Start: 2024-09-16 | End: 2024-09-17 | Stop reason: HOSPADM

## 2024-09-16 RX ORDER — ROCURONIUM BROMIDE 10 MG/ML
INJECTION, SOLUTION INTRAVENOUS AS NEEDED
Status: DISCONTINUED | OUTPATIENT
Start: 2024-09-16 | End: 2024-09-16

## 2024-09-16 RX ORDER — ACETAMINOPHEN 650 MG/1
650 SUPPOSITORY RECTAL EVERY 4 HOURS PRN
Status: DISCONTINUED | OUTPATIENT
Start: 2024-09-16 | End: 2024-09-17 | Stop reason: HOSPADM

## 2024-09-16 RX ORDER — SODIUM CHLORIDE, SODIUM LACTATE, POTASSIUM CHLORIDE, CALCIUM CHLORIDE 600; 310; 30; 20 MG/100ML; MG/100ML; MG/100ML; MG/100ML
100 INJECTION, SOLUTION INTRAVENOUS CONTINUOUS
Status: DISCONTINUED | OUTPATIENT
Start: 2024-09-16 | End: 2024-09-16

## 2024-09-16 RX ORDER — ALBUTEROL SULFATE 0.83 MG/ML
2.5 SOLUTION RESPIRATORY (INHALATION) EVERY 6 HOURS PRN
Status: DISCONTINUED | OUTPATIENT
Start: 2024-09-16 | End: 2024-09-16

## 2024-09-16 RX ORDER — ALBUTEROL SULFATE 90 UG/1
2 INHALANT RESPIRATORY (INHALATION) EVERY 4 HOURS PRN
Status: DISCONTINUED | OUTPATIENT
Start: 2024-09-16 | End: 2024-09-16

## 2024-09-16 RX ORDER — KETOROLAC TROMETHAMINE 30 MG/ML
15 INJECTION, SOLUTION INTRAMUSCULAR; INTRAVENOUS EVERY 6 HOURS PRN
Status: DISCONTINUED | OUTPATIENT
Start: 2024-09-16 | End: 2024-09-16

## 2024-09-16 RX ORDER — MORPHINE SULFATE 2 MG/ML
2 INJECTION, SOLUTION INTRAMUSCULAR; INTRAVENOUS EVERY 4 HOURS PRN
Status: DISCONTINUED | OUTPATIENT
Start: 2024-09-16 | End: 2024-09-16

## 2024-09-16 RX ORDER — KETOROLAC TROMETHAMINE 30 MG/ML
15 INJECTION, SOLUTION INTRAMUSCULAR; INTRAVENOUS EVERY 6 HOURS
Status: DISCONTINUED | OUTPATIENT
Start: 2024-09-16 | End: 2024-09-17 | Stop reason: HOSPADM

## 2024-09-16 RX ORDER — LIDOCAINE HYDROCHLORIDE 20 MG/ML
INJECTION, SOLUTION EPIDURAL; INFILTRATION; INTRACAUDAL; PERINEURAL AS NEEDED
Status: DISCONTINUED | OUTPATIENT
Start: 2024-09-16 | End: 2024-09-16

## 2024-09-16 RX ORDER — KETOROLAC TROMETHAMINE 30 MG/ML
15 INJECTION, SOLUTION INTRAMUSCULAR; INTRAVENOUS ONCE
Status: COMPLETED | OUTPATIENT
Start: 2024-09-16 | End: 2024-09-16

## 2024-09-16 RX ORDER — CHLORTHALIDONE 25 MG/1
12.5 TABLET ORAL DAILY
Status: DISCONTINUED | OUTPATIENT
Start: 2024-09-16 | End: 2024-09-17 | Stop reason: HOSPADM

## 2024-09-16 RX ORDER — LEVOTHYROXINE SODIUM 100 UG/1
100 TABLET ORAL DAILY
Status: DISCONTINUED | OUTPATIENT
Start: 2024-09-16 | End: 2024-09-17 | Stop reason: HOSPADM

## 2024-09-16 SDOH — SOCIAL STABILITY: SOCIAL INSECURITY: ABUSE: ADULT

## 2024-09-16 SDOH — SOCIAL STABILITY: SOCIAL INSECURITY: ARE THERE ANY APPARENT SIGNS OF INJURIES/BEHAVIORS THAT COULD BE RELATED TO ABUSE/NEGLECT?: NO

## 2024-09-16 SDOH — SOCIAL STABILITY: SOCIAL INSECURITY: HAVE YOU HAD ANY THOUGHTS OF HARMING ANYONE ELSE?: NO

## 2024-09-16 SDOH — SOCIAL STABILITY: SOCIAL INSECURITY: HAVE YOU HAD THOUGHTS OF HARMING ANYONE ELSE?: NO

## 2024-09-16 SDOH — SOCIAL STABILITY: SOCIAL INSECURITY: HAS ANYONE EVER THREATENED TO HURT YOUR FAMILY OR YOUR PETS?: NO

## 2024-09-16 SDOH — SOCIAL STABILITY: SOCIAL INSECURITY: DO YOU FEEL ANYONE HAS EXPLOITED OR TAKEN ADVANTAGE OF YOU FINANCIALLY OR OF YOUR PERSONAL PROPERTY?: NO

## 2024-09-16 SDOH — SOCIAL STABILITY: SOCIAL INSECURITY: ARE YOU OR HAVE YOU BEEN THREATENED OR ABUSED PHYSICALLY, EMOTIONALLY, OR SEXUALLY BY ANYONE?: NO

## 2024-09-16 SDOH — SOCIAL STABILITY: SOCIAL INSECURITY: WERE YOU ABLE TO COMPLETE ALL THE BEHAVIORAL HEALTH SCREENINGS?: YES

## 2024-09-16 SDOH — SOCIAL STABILITY: SOCIAL INSECURITY: DO YOU FEEL UNSAFE GOING BACK TO THE PLACE WHERE YOU ARE LIVING?: NO

## 2024-09-16 SDOH — SOCIAL STABILITY: SOCIAL INSECURITY: DOES ANYONE TRY TO KEEP YOU FROM HAVING/CONTACTING OTHER FRIENDS OR DOING THINGS OUTSIDE YOUR HOME?: NO

## 2024-09-16 ASSESSMENT — COGNITIVE AND FUNCTIONAL STATUS - GENERAL
DAILY ACTIVITIY SCORE: 24
MOBILITY SCORE: 24
DAILY ACTIVITIY SCORE: 24
MOBILITY SCORE: 24
PATIENT BASELINE BEDBOUND: NO
DAILY ACTIVITIY SCORE: 24
MOBILITY SCORE: 24

## 2024-09-16 ASSESSMENT — PATIENT HEALTH QUESTIONNAIRE - PHQ9
SUM OF ALL RESPONSES TO PHQ9 QUESTIONS 1 & 2: 0
2. FEELING DOWN, DEPRESSED OR HOPELESS: NOT AT ALL
1. LITTLE INTEREST OR PLEASURE IN DOING THINGS: NOT AT ALL

## 2024-09-16 ASSESSMENT — ACTIVITIES OF DAILY LIVING (ADL)
HEARING - RIGHT EAR: FUNCTIONAL
FEEDING YOURSELF: INDEPENDENT
ADEQUATE_TO_COMPLETE_ADL: YES
WALKS IN HOME: INDEPENDENT
BATHING: INDEPENDENT
GROOMING: INDEPENDENT
TOILETING: INDEPENDENT
LACK_OF_TRANSPORTATION: NO
PATIENT'S MEMORY ADEQUATE TO SAFELY COMPLETE DAILY ACTIVITIES?: YES
JUDGMENT_ADEQUATE_SAFELY_COMPLETE_DAILY_ACTIVITIES: YES
HEARING - LEFT EAR: FUNCTIONAL
DRESSING YOURSELF: INDEPENDENT

## 2024-09-16 ASSESSMENT — PAIN SCALES - GENERAL
PAINLEVEL_OUTOF10: 7
PAINLEVEL_OUTOF10: 7
PAINLEVEL_OUTOF10: 4
PAINLEVEL_OUTOF10: 7
PAIN_LEVEL: 0
PAINLEVEL_OUTOF10: 7
PAINLEVEL_OUTOF10: 7
PAINLEVEL_OUTOF10: 3
PAINLEVEL_OUTOF10: 0 - NO PAIN
PAINLEVEL_OUTOF10: 4
PAINLEVEL_OUTOF10: 3
PAINLEVEL_OUTOF10: 4
PAINLEVEL_OUTOF10: 4
PAINLEVEL_OUTOF10: 2
PAINLEVEL_OUTOF10: 7
PAINLEVEL_OUTOF10: 7

## 2024-09-16 ASSESSMENT — PAIN DESCRIPTION - LOCATION
LOCATION: ABDOMEN

## 2024-09-16 ASSESSMENT — PAIN SCALES - WONG BAKER
WONGBAKER_NUMERICALRESPONSE: HURTS WHOLE LOT
WONGBAKER_NUMERICALRESPONSE: HURTS LITTLE BIT

## 2024-09-16 ASSESSMENT — PAIN DESCRIPTION - DESCRIPTORS: DESCRIPTORS: ACHING;THROBBING

## 2024-09-16 ASSESSMENT — PAIN - FUNCTIONAL ASSESSMENT
PAIN_FUNCTIONAL_ASSESSMENT: 0-10
PAIN_FUNCTIONAL_ASSESSMENT: 0-10
PAIN_FUNCTIONAL_ASSESSMENT: UNABLE TO SELF-REPORT
PAIN_FUNCTIONAL_ASSESSMENT: UNABLE TO SELF-REPORT
PAIN_FUNCTIONAL_ASSESSMENT: 0-10
PAIN_FUNCTIONAL_ASSESSMENT: 0-10
PAIN_FUNCTIONAL_ASSESSMENT: UNABLE TO SELF-REPORT
PAIN_FUNCTIONAL_ASSESSMENT: UNABLE TO SELF-REPORT
PAIN_FUNCTIONAL_ASSESSMENT: 0-10

## 2024-09-16 ASSESSMENT — LIFESTYLE VARIABLES
HOW OFTEN DO YOU HAVE A DRINK CONTAINING ALCOHOL: NEVER
SKIP TO QUESTIONS 9-10: 1
HOW MANY STANDARD DRINKS CONTAINING ALCOHOL DO YOU HAVE ON A TYPICAL DAY: PATIENT DOES NOT DRINK
AUDIT-C TOTAL SCORE: 0
HOW OFTEN DO YOU HAVE 6 OR MORE DRINKS ON ONE OCCASION: NEVER
AUDIT-C TOTAL SCORE: 0

## 2024-09-16 ASSESSMENT — COLUMBIA-SUICIDE SEVERITY RATING SCALE - C-SSRS
2. HAVE YOU ACTUALLY HAD ANY THOUGHTS OF KILLING YOURSELF?: NO
6. HAVE YOU EVER DONE ANYTHING, STARTED TO DO ANYTHING, OR PREPARED TO DO ANYTHING TO END YOUR LIFE?: NO

## 2024-09-16 ASSESSMENT — PAIN DESCRIPTION - ORIENTATION: ORIENTATION: RIGHT

## 2024-09-16 NOTE — PROGRESS NOTES
Home alone      09/16/24 0916   Current Planned Discharge Disposition   Current Planned Discharge Disposition Home

## 2024-09-16 NOTE — CONSULTS
Reason For Consult  Symptomatic cholelithiasis     History Of Present Illness  Thuy Elizondo is a 59 y.o. female presenting with RUQ pain since . She reports she has had 3 other episodes like this over the past year. She noticed pain seems to come on after eating and lasts a few hours. She Denies any radiating of the pain. Denies any change in stool habits.     ED workup significant for RUQ US showing gallstones and elevated LFTs.     Past Medical History  She has a past medical history of Abnormal uterine bleeding (2015), Depression, unspecified (2014), Hypothyroidism, unspecified (2014), and Personal history of diseases of the blood and blood-forming organs and certain disorders involving the immune mechanism (2014).    Surgical History  She has a past surgical history that includes  section, classic (2014); Carpal tunnel release (2014); and Tonsillectomy (2014).     Social History  She reports that she has never smoked. She has never used smokeless tobacco. She reports current alcohol use. She reports that she does not use drugs.    Family History  Family History   Problem Relation Name Age of Onset    Hypertension Mother      Diabetes Father          Allergies  Animal dander and Egg    Review of Systems  A full 10 point ROS was completed. Nothing positive other than what was mentioned in the HPI.      Physical Exam  PE:  Constitutional: A&Ox3, calm and cooperative  Head/Neck: Neck supple, no JVD  Cardiovascular: RRR  Respiratory/Thorax: Non labored breathing on RA  Gastrointestinal: Abdomen nondistended, soft, Tender in RUQ to deep palpation  Genitourinary: Voiding independently   Musculoskeletal: ROM intact, no joint swelling, normal strength  Extremities: CORONA, No peripheral edema  Neurological: No focal deficits   Psychological: Appropriate mood and behavior  Skin: Warm and dry.       Last Recorded Vitals  Blood pressure (!) 161/92, pulse 70, temperature  "36.4 °C (97.5 °F), resp. rate 18, height 1.6 m (5' 3\"), weight 95.3 kg (210 lb), last menstrual period 08/01/2022, SpO2 96%.    Relevant Results  Results for orders placed or performed during the hospital encounter of 09/16/24 (from the past 24 hour(s))   CBC and Auto Differential   Result Value Ref Range    WBC 7.9 4.4 - 11.3 x10*3/uL    nRBC 0.0 0.0 - 0.0 /100 WBCs    RBC 4.69 4.00 - 5.20 x10*6/uL    Hemoglobin 13.5 12.0 - 16.0 g/dL    Hematocrit 42.5 36.0 - 46.0 %    MCV 91 80 - 100 fL    MCH 28.8 26.0 - 34.0 pg    MCHC 31.8 (L) 32.0 - 36.0 g/dL    RDW 13.4 11.5 - 14.5 %    Platelets 232 150 - 450 x10*3/uL    Neutrophils % 71.9 40.0 - 80.0 %    Immature Granulocytes %, Automated 0.5 0.0 - 0.9 %    Lymphocytes % 21.5 13.0 - 44.0 %    Monocytes % 6.0 2.0 - 10.0 %    Eosinophils % 0.0 0.0 - 6.0 %    Basophils % 0.1 0.0 - 2.0 %    Neutrophils Absolute 5.70 1.20 - 7.70 x10*3/uL    Immature Granulocytes Absolute, Automated 0.04 0.00 - 0.70 x10*3/uL    Lymphocytes Absolute 1.71 1.20 - 4.80 x10*3/uL    Monocytes Absolute 0.48 0.10 - 1.00 x10*3/uL    Eosinophils Absolute 0.00 0.00 - 0.70 x10*3/uL    Basophils Absolute 0.01 0.00 - 0.10 x10*3/uL   Comprehensive metabolic panel   Result Value Ref Range    Glucose 127 (H) 74 - 99 mg/dL    Sodium 139 136 - 145 mmol/L    Potassium 4.0 3.5 - 5.3 mmol/L    Chloride 101 98 - 107 mmol/L    Bicarbonate 29 21 - 32 mmol/L    Anion Gap 13 10 - 20 mmol/L    Urea Nitrogen 15 6 - 23 mg/dL    Creatinine 0.64 0.50 - 1.05 mg/dL    eGFR >90 >60 mL/min/1.73m*2    Calcium 9.2 8.6 - 10.3 mg/dL    Albumin 4.3 3.4 - 5.0 g/dL    Alkaline Phosphatase 123 (H) 33 - 110 U/L    Total Protein 7.1 6.4 - 8.2 g/dL    AST 91 (H) 9 - 39 U/L    Bilirubin, Total 0.5 0.0 - 1.2 mg/dL     (H) 7 - 45 U/L   Magnesium   Result Value Ref Range    Magnesium 2.30 1.60 - 2.40 mg/dL   Lactate   Result Value Ref Range    Lactate 1.3 0.4 - 2.0 mmol/L   Lipase   Result Value Ref Range    Lipase 49 9 - 82 U/L "   Troponin I, High Sensitivity   Result Value Ref Range    Troponin I, High Sensitivity 4 0 - 13 ng/L      US gallbladder   Final Result   Increased hepatic parenchymal echotexture, most commonly indicating   hepatic steatosis.   Distended gallbladder with mild gallbladder wall thickening, and   cholelithiasis with a mildly dilated common bile duct but no evidence   of pericholecystic fluid.  Negative sonographic Campos's sign.    Findings suggest acute cholecystitis in the appropriate clinical   setting, correlate with biliary lab values.   Signed by Magen Licea            Assessment/Plan     Plan Symptomatic cholelithiasis  - Admit to medicine  - NPO  - IVF  - IV zosyn  - PRN pain control  - PRN antiemetic    Will staff with attending in AM. Would anticipate surgery for cholecystectomy. But will await final recs from the Surgeon.    I spent 60 minutes in the professional and overall care of this patient.      Singh Shetty PA-C

## 2024-09-16 NOTE — ANESTHESIA POSTPROCEDURE EVALUATION
Patient: Thuy Elizondo    Procedure Summary       Date: 09/16/24 Room / Location: [facility] OR 17 / Virtual [facility] OR    Anesthesia Start: 0853 Anesthesia Stop: 1011    Procedure: Cholecystectomy Laparoscopy Diagnosis:       Right upper quadrant abdominal pain      Symptomatic cholelithiasis      (Right upper quadrant abdominal pain [R10.11])      (Symptomatic cholelithiasis [K80.20])    Surgeons: Jesus Mendiola MD Responsible Provider: Andre Bermudez MD    Anesthesia Type: general ASA Status: 3            Anesthesia Type: general    Vitals Value Taken Time   /90 09/16/24 1047   Temp 36.5 °C (97.7 °F) 09/16/24 1005   Pulse 81 09/16/24 1050   Resp 15 09/16/24 1045   SpO2 94 % 09/16/24 1050   Vitals shown include unfiled device data.    Anesthesia Post Evaluation    Patient location during evaluation: bedside  Patient participation: complete - patient cannot participate  Level of consciousness: awake  Pain score: 0  Pain management: adequate  Airway patency: patent  Cardiovascular status: acceptable and hemodynamically stable  Respiratory status: acceptable and nonlabored ventilation  Hydration status: acceptable  Postoperative Nausea and Vomiting: none        No notable events documented.

## 2024-09-16 NOTE — PROGRESS NOTES
09/16/24 0916   [facility] Disability Status   Are you deaf or do you have serious difficulty hearing? N   Are you blind or do you have serious difficulty seeing, even when wearing glasses? N   Because of a physical, mental, or emotional condition, do you have serious difficulty concentrating, remembering, or making decisions? (5 years old or older) N   Do you have serious difficulty walking or climbing stairs? N   Do you have serious difficulty dressing or bathing? N   Because of a physical, mental, or emotional condition, do you have serious difficulty doing errands alone such as visiting the doctor? N

## 2024-09-16 NOTE — OP NOTE
Cholecystectomy Laparoscopy Operative Note     Date: 2024  OR Location: [facility] A OR    Name: Thuy Elizondo, : 1965, Age: 59 y.o., MRN: [id number], Sex: female    Diagnosis  Pre-op Diagnosis      * Right upper quadrant abdominal pain [R10.11]     * Symptomatic cholelithiasis [K80.20] Post-op Diagnosis     * Right upper quadrant abdominal pain [R10.11]     * Symptomatic cholelithiasis [K80.20]     Procedures  Cholecystectomy Laparoscopy  61793 - PA LAPS SURG CHOLECYSTECTOMY W/CHOLANGIOGRAPHY      Surgeons      * Jesus Mendiola - Primary    Resident/Fellow/Other Assistant:  Surgeons and Role:  * No surgeons found with a matching role *    Procedure Summary  Anesthesia: General  ASA: III  Anesthesia Staff: Anesthesiologist: Andre Bermudez MD  C-AA: DALJIT Cortes  HEATH: Bridgett Shah  Estimated Blood Loss: 25mL  Intra-op Medications:   Administrations occurring from 0830 to 1005 on 24:   Medication Name Total Dose   ioversol (Optiray-320) injection 20 mL   sodium chloride 0.9 % irrigation solution 2,000 mL   bupivacaine PF (Marcaine) 0.5 % (5 mg/mL) injection 26 mL   chlorthalidone (Hygroton) tablet 12.5 mg Cannot be calculated   lactated Ringer's infusion 106.67 mL   levothyroxine (Synthroid, Levoxyl) tablet 100 mcg Cannot be calculated   metoprolol succinate XL (Toprol-XL) 24 hr tablet 100 mg Cannot be calculated   sennosides (Senokot) tablet 17.2 mg Cannot be calculated              Anesthesia Record               Intraprocedure I/O Totals          Intake    lactated Ringer's infusion 600.00 mL    Total Intake 600 mL       Output    Est. Blood Loss 15 mL    Total Output 15 mL       Net    Net Volume 585 mL          Specimen:   ID Type Source Tests Collected by Time   1 : GALLBLADDER Tissue GALLBLADDER CHOLECYSTECTOMY SURGICAL PATHOLOGY EXAM Jesus Mendiola MD 2024 0923        Staff:   Circulator: Crissy Bear Person: Audi Bear Person: Maryana Bobo Circulator: Leticia         Drains  and/or Catheters: * None in log *    Tourniquet Times:         Implants:     Findings: Gallbladder inflamed, full of stones.  Cholangiogram looked okay    Indications: Thuy Elizondo is an 59 y.o. female who is having surgery for Right upper quadrant abdominal pain [R10.11]  Symptomatic cholelithiasis [K80.20].     The patient was seen in the preoperative area. The risks, benefits, complications, treatment options, non-operative alternatives, expected recovery and outcomes were discussed with the patient. The possibilities of reaction to medication, pulmonary aspiration, injury to surrounding structures, bleeding, recurrent infection, the need for additional procedures, failure to diagnose a condition, and creating a complication requiring transfusion or operation were discussed with the patient. The patient concurred with the proposed plan, giving informed consent.  The site of surgery was properly noted/marked if necessary per policy. The patient has been actively warmed in preoperative area. Preoperative antibiotics have been ordered and given within 1 hours of incision. Venous thrombosis prophylaxis have been ordered including bilateral sequential compression devices    Procedure Details:  Description:  Patient was brought to the operating room placed supine on the table.  Timeout was performed which confirmed patient and procedure.  Perioperative antibiotics were administered.  Gen. anesthesia was administered through an endotracheal tube.  The abdomen was prepped and draped sterilely.    I injected half percent Marcaine and then made a sharp infraumbilical incision with the knife.  Sharp incision was made in the fascia.  The peritoneal cavity was entered under direct visualization and a Mikey port was placed.  The abdomen was insufflated and the patient was placed in steep reverse Trendelenburg.  A 5 mm 30° camera was introduced.  I placed a right upper quadrant 5 mm port and another 5 mL port in the midline  subxiphoid area.  The gallbladder was visualized.  It was grasped and retracted superiorly into the right.  Meticulous dissection was then performed in the area of Calot triangle.  Critical view was achieved.  Posterior cystic plate visualized.  The cystic artery and cystic duct were skeletonized.  The artery was divided between hemoclips.  Endo Clip placed on the gallbladder side and then made a ductotomy with EndoShears.  Ranfac cholangiocatheter was inserted through a separate angiocatheter sheath.  Cholangiogram was obtained using C-arm fluoroscopy.  The anatomy was noted to be normal.  No obvious filling defects seen. Ranfac catheter removed.  I placed 3 clips on the distal cystic duct and one toward the gallbladder and divided with EndoShears.  The gallbladder was then dissected atraumatically out of the liver bed with hook cautery.  Once it was liberated it was placed in the Endo Catch bag and brought out through the umbilicus.  Liver bed was inspected and noted to be hemostatic.  Clips were noted to be in good position.  Gentle irrigation was performed.  The effluent was noted to be clear.  The ports were removed under direct visualization.  The abdomen was desufflated.  The fascia at the umbilicus was closed with 0 Vicryl.  Wounds were irrigated.  Skin incisions were closed with 4-0 Biosyn and Dermabond.  Patient was ultimately extubated and transferred to the recovery room in satisfactory condition.    Complications:  None; patient tolerated the procedure well.    Disposition: PACU - hemodynamically stable.  Condition: stable         Additional Details:     Attending Attestation: I was present for the entire procedure.    Jesus Mendiola  Phone Number: [phone]

## 2024-09-16 NOTE — ED PROVIDER NOTES
HPI   Chief Complaint   Patient presents with    Abdominal Pain     Right Upper Quadrant       59-year-old female presents the ED today with a chief concern of abdominal pain.  Symptoms have been present for couple hours.  Reports similar episodes in the past that have resolved spontaneously.  Reports this pain is worse.  Reports right upper quadrant pain.  Describes the pain as throbbing, aching, and bloating.  Denies fevers.  Denies nausea or vomiting.  Denies chest pain or shortness of breath.  Denies numbness or tingling or weakness.  She  reports a couple episodes of diarrhea.  Reports some slight foul-smelling urine.  She denies any history of gallbladder problems.  Denies any surgeries in the abdomen.  She has no other symptoms or concerns at this time.      History provided by:  Patient   used: No            Patient History   Past Medical History:   Diagnosis Date    Abnormal uterine bleeding 2015    Depression, unspecified 2014    Depression    Hypothyroidism, unspecified 2014    Hypothyroidism    Personal history of diseases of the blood and blood-forming organs and certain disorders involving the immune mechanism 2014    History of anemia     Past Surgical History:   Procedure Laterality Date    CARPAL TUNNEL RELEASE  2014    Neuroplasty Decompression Median Nerve At Carpal Tunnel     SECTION, CLASSIC  2014     Section    TONSILLECTOMY  2014    Tonsillectomy     Family History   Problem Relation Name Age of Onset    Hypertension Mother      Diabetes Father       Social History     Tobacco Use    Smoking status: Never    Smokeless tobacco: Never   Substance Use Topics    Alcohol use: Yes    Drug use: Never       Physical Exam   ED Triage Vitals [09/15/24 2312]   Temp Heart Rate Respirations BP   -- 72 18 (!) 169/97      Pulse Ox Temp src Heart Rate Source Patient Position   97 % -- Monitor Sitting      BP Location FiO2 (%)      Left arm --       Physical Exam  Constitutional: Vital signs per nursing notes.  Well developed, well nourished.  No acute distress.    Eyes: PERRL; conjunctivae and lids normal  ENT: Ears normal externally; face symmetric. voice normal  Neck: neck supple, no meningismus; trachea midline without deviation  Respiratory: normal respiratory effort and excursion; no rales, rhonchi, or wheezes; equal air entry  Cardiovascular: RRR, 2+ pulses extremities   Neurological: normal speech; CN II-XII grossly intact, normal motor and sensory function  GI: no distention, soft.  Tenderness in right upper quadrant and epigastric region.  No rebound tenderness or guarding.  No palpable masses.  No pulsatile masses.  : Deferred  Musculoskeletal: normal gait and station; normal digits and nails; normal to palpation; normal strength/tone; neurovascular status intact.  Skin: normal to inspection; normal to palpation; no rash      ED Course & [facility]   ED Course as of 09/16/24 0435   Mon Sep 16, 2024   0123 CBC shows no evidence of leukocytosis or anemia.  CMP shows no ZHEN.  Does have elevated AST and ALT.  Elevated alk phos.   normal lactate and lipase.  Normal magnesium. [MC]   0231    GALLBLADDER:  The gallbladder is distended and contains multiple stones with a few  echogenic foci seen in the gallbladder wall.  There is no  pericholecystic fluid however there is mild gallbladder wall  thickening.  Sonographic Campos's sign is negative.        BILE DUCTS:  The common bile duct is dilated and measures 0.7 cm.  There is no  intrahepatic biliary dilatation.       PANCREAS:  The pancreas demonstrates a normal homogeneous echotexture with the  tail not well seen due to overlying bowel gas.  The pancreatic duct  measures 0.3 cm.     RIGHT KIDNEY:  The right kidney measures 11.3 cm in length.  Renal cortical  echotexture is normal.  There is no hydronephrosis.  There are no  stones.  There are no cysts.  IMPRESSION:  Increased hepatic  parenchymal echotexture, most commonly indicating  hepatic steatosis.  Distended gallbladder with mild gallbladder wall thickening, and  cholelithiasis with a mildly dilated common bile duct but no evidence  of pericholecystic fluid.  Negative sonographic Campos's sign.   Findings suggest acute cholecystitis in the appropriate clinical  setting, correlate with biliary lab values.  Signed by Magen Licea   []   0237 Ventricular rate 69 bpm.  DC interval 144 ms.  QRS duration 64 ms.  QT/QTc 390/422 ms.  Patient is in normal sinus rhythm at a ventricular rate of 69 bpm.  Normal axis.  There is good R wave progression.  No right or left bundle-branch block.  No ST elevations.  No previous EKG to compare this to. []   0431 Spoke with Dr. Barillas who accepts admission of this patient []   0432 Troponin normal []      ED Course User Index  [] Ted De La Rosa PA-C         Diagnoses as of 09/16/24 0435   Right upper quadrant abdominal pain   Elevated blood pressure reading                 No data recorded     Rohini Coma Scale Score: 15 (09/15/24 2314 : Joseph Chapley, EMT)                           Medical Decision Making  59-year-old female presents the ED today with a chief concern of abdominal pain.  Vital signs reassuring.  Patient overall appears well and is nontoxic-appearing.  Was given IV fluids, Toradol, and Zofran in the ED. patient has full range of motion of the neck without any meningismus.  Satting on room air.  Nontoxic.  Not tachycardic.  Afebrile.  Patient felt much better after administration of medications.  Her labs are overall reassuring but does have elevated AST, ALT, and alk phos.  Denies the use of Tylenol.  She has right upper quadrant pain on my exam.  Negative Campos sign.  Her ultrasound showed evidence of acute cholecystitis as noted above.  Spoke with surgical TONE Singh Shetty who also saw and evaluated this patient and request admission to medicine.  Spoke with Dr. Barillas who  accepted admission of this patient to the inpatient unit.  I have low suspicion for any AAA at this time.  She has 2+ symmetric pulses throughout and no abdominal pulsatile mass.  No signs of PE or ACS at this time.  Low suspicion for cardiac pathology of pain.  Symptoms likely related to symptomatic gallstones versus acute cholecystitis.  Discussed my impression and findings with patient she feels comfortable being admitted to the inpatient unit.  Attending physician also saw and evaluated this patient.    Differential diagnosis: Cholecystitis, pancreatitis, PE, ACS, tamponade, pneumothorax, pneumonia, appendicitis, SBO, mesenteric ischemia, AAA    Disposition/treatment  1.  See above    Shared decision-making was used patient feels comfortable being admitted to the inpatient unit under Dr. Barillas             Procedure  Procedures     Ted De La Rosa PA-C  09/16/24 0434       Ted De La Rosa PA-C  09/16/24 0435

## 2024-09-16 NOTE — PROGRESS NOTES
Transitional Care Coordination Progress Note:  Plan per Medical/Surgical team: treatment of cholecystitis with surgery consult for lap yanelis today  Status: Inpatient   Payor source: [employer]  Discharge disposition: Home alone   Potential Barriers: abd pain & bloating   ADOD: 9/17/2024   SIXTO Lozano RN, BSN Transitional Care Coordinator ED# [phone]      09/16/24 0916   Discharge Planning   Living Arrangements Alone   Support Systems Parent   Assistance Needed surgery today   Type of Residence Private residence   Number of Stairs to Enter Residence 1   Number of Stairs Within Residence 0   Home or Post Acute Services None   Expected Discharge Disposition Home   Does the patient need discharge transport arranged? No   Financial Resource Strain   How hard is it for you to pay for the very basics like food, housing, medical care, and heating? Not hard   Housing Stability   In the last 12 months, was there a time when you were not able to pay the mortgage or rent on time? N   In the past 12 months, how many times have you moved where you were living? 1   At any time in the past 12 months, were you homeless or living in a shelter (including now)? N   Transportation Needs   In the past 12 months, has lack of transportation kept you from medical appointments or from getting medications? no   In the past 12 months, has lack of transportation kept you from meetings, work, or from getting things needed for daily living? No

## 2024-09-16 NOTE — ED TRIAGE NOTES
"Patient presents to ED with complaint of Abdominal pain in the Right Upper Quadrant. The Patient has been experiencing the pain continuously since 20:30 today, but has had recurrent episodes like this over the past year. Pain is described as a constant \"indigestion\" and \"bloating' that is \"^ out of 10\" and radiates to the Mid-Left Lower Quadrant of the Abdomen. There is no complaint of Chest pain or dyspnea. She denies any N/V, but may have had diarrhea with \"loos stools\". She denies any constipation or hematochezia. PMHx is significant for: GERD, IBS, HTN, HLD, Hypothyroidism, Anxiety, Depression, Cervical & Lumbar radiculopathyand Dermatitis.  "

## 2024-09-16 NOTE — H&P
History Of Present Illness  Thuy Elizondo is a 59 y.o. female presenting with 3 days of acute epigastric right upper quadrant abdominal pain..  This is about the third episode she has had over the past 12 months.  This morning she is feeling little bit better.  In the ER an ultrasound showed dilated gallbladder with stones.  Transaminases were also elevated.     Past Medical History  Past Medical History:   Diagnosis Date    Abnormal uterine bleeding 2015    Depression, unspecified 2014    Depression    Hypothyroidism, unspecified 2014    Hypothyroidism    Personal history of diseases of the blood and blood-forming organs and certain disorders involving the immune mechanism 2014    History of anemia       Surgical History  Past Surgical History:   Procedure Laterality Date    CARPAL TUNNEL RELEASE  2014    Neuroplasty Decompression Median Nerve At Carpal Tunnel     SECTION, CLASSIC  2014     Section    TONSILLECTOMY  2014    Tonsillectomy        Social History  She reports that she has never smoked. She has never used smokeless tobacco. She reports current alcohol use. She reports that she does not use drugs.    Family History  Family History   Problem Relation Name Age of Onset    Hypertension Mother      Diabetes Father          Allergies  Animal dander and Egg    Review of Systems  Constitutional: no weight loss, no fevers, no malaise  HEENT: negative  Neck: negative  Pulmonary: no SOB, no cough  CV: no chest pain, otherwise negative  GI: See HPI  : no hematuria, retention.  MS: no aches/pains  Neurologic: negative  Skin: no rashes, lesions  HEME: no bleeding tendency, no bruising  Psych: no mood issues    Physical Exam  Constitutional: Mild distress.  Alert and oriented x 3.    Skin: non jaundiced  HEENT: normal  Lungs: clear to auscultation  CV: RRR, S1S2, no murmurs  Abdomen: soft, mild discomfort RUQ.  No obvious hernias.  No diffuse peritoneal  "signs  MS: grossly normal  Neuro: grossly normal    Last Recorded Vitals  Blood pressure 137/89, pulse 74, temperature 36.4 °C (97.5 °F), resp. rate 16, height 1.6 m (5' 3\"), weight 95.3 kg (210 lb), last menstrual period 08/01/2022, SpO2 97%.    Relevant Results        tatus: Final result     PACS Images     Show images for US gallbladder  Signed by    Signed Time Phone Pager   Magen Licea MD 9/16/2024 02:26 [phone]      Exam Information    Status Exam Begun Exam Ended   Final 9/16/2024 01:13 9/16/2024 01:57     Study Result    Narrative & Impression   STUDY:  Right Upper Quadrant Ultrasound; 1:57 AM  INDICATION:  Right upper quadrant pain for 5 hours.  COMPARISON:  None Available.  ACCESSION NUMBER(S):  [id number]  ORDERING CLINICIAN:  TD CASTILLO  TECHNIQUE:  Ultrasound of the Right Upper Quadrant.  FINDINGS:  LIVER:  The liver is slightly enlarged and demonstrates increased  echogenicity.       GALLBLADDER:  The gallbladder is distended and contains multiple stones with a few  echogenic foci seen in the gallbladder wall.  There is no  pericholecystic fluid however there is mild gallbladder wall  thickening.  Sonographic Campos's sign is negative.        BILE DUCTS:  The common bile duct is dilated and measures 0.7 cm.  There is no  intrahepatic biliary dilatation.       PANCREAS:  The pancreas demonstrates a normal homogeneous echotexture with the  tail not well seen due to overlying bowel gas.  The pancreatic duct  measures 0.3 cm.     RIGHT KIDNEY:  The right kidney measures 11.3 cm in length.  Renal cortical  echotexture is normal.  There is no hydronephrosis.  There are no  stones.  There are no cysts.  IMPRESSION:  Increased hepatic parenchymal echotexture, most commonly indicating  hepatic steatosis.  Distended gallbladder with mild gallbladder wall thickening, and  cholelithiasis with a mildly dilated common bile duct but no evidence  of pericholecystic fluid.  Negative sonographic Campos's " sign.   Findings suggest acute cholecystitis in the appropriate clinical  setting, correlate with biliary lab values.  Signed by Magen Licea        Assessment/Plan   Assessment & Plan  Right upper quadrant abdominal pain    Obesity    Elevated liver function tests    Symptomatic cholelithiasis      Impression:  Symptomatic Cholelithiasis    -I carefully explained the pathophysiology of biliary tract disease using terms and pictures  -Rationale for surgical intervention described  -Technique of laparoscopic cholecystectomy explained (both standard and robotic assisted)  -Risks of surgery elucidated (bleeding, infection, bile leak, CBD injury, etc)  -Other options presented (watchful waiting, avoidance of fatty foods)  -All patient questions answered to her satisfaction.  -Patient has indicated desire to pursue surgical intervention  -She has been added onto the morning OR schedule       I spent 40 minutes in the professional and overall care of this patient.      Jesus Mendiola MD

## 2024-09-16 NOTE — ANESTHESIA PROCEDURE NOTES
Airway  Date/Time: 9/16/2024 9:02 AM  Urgency: elective    Airway not difficult    Staffing  Performed: HEATH   Authorized by: Andre Bermudez MD    Performed by: DALJIT Cortes  Patient location during procedure: OR    Indications and Patient Condition  Indications for airway management: anesthesia and airway protection  Spontaneous Ventilation: absent  Sedation level: deep  Preoxygenated: yes  Patient position: sniffing  MILS not maintained throughout  Mask difficulty assessment: 2 - vent by mask + OA or adjuvant +/- NMBA  Planned trial extubation    Final Airway Details  Final airway type: endotracheal airway      Successful airway: ETT  Cuffed: yes   Successful intubation technique: direct laryngoscopy  Facilitating devices/methods: OPA  Endotracheal tube insertion site: oral  Blade: Chanell  Blade size: #3  ETT size (mm): 7.0  Cormack-Lehane Classification: grade I - full view of glottis  Placement verified by: chest auscultation, capnometry and palpation of cuff   Measured from: teeth  ETT to teeth (cm): 22  Number of attempts at approach: 1

## 2024-09-16 NOTE — PROGRESS NOTES
"Thuy Elizondo is a 59 y.o. female who was referred to the Clinical Pharmacy Team to complete a virtual Transitions of Care encounter for discharge medication optimization. The patient was referred for their asthma.    Attending: Dr. Vic Barillas    PCP: Dr. Renetta Coffman    _______________________________________________________________________  PHARMACY ASSESSMENT    Home Pharmacy: [facility]  Meds to beds? yes    No issues reported in regards to affordability, accessibility, adherence, organization, and adverse effects  _______________________________________________________________________  ASTHMA/COPD ASSESSMENT    CURRENT PHARMACOTHERAPY  - Albuterol 90 mcg HFA    RESCUE INHALER USE  - \"rarely\"    SECONDARY PREVENTION  - Influenza: last history of 10/2022 (DUE)  - Pneumococcal: PPSV23 12/2/2008      DUE for PCV18 or PPSV23  - RSV: no history  - COVID: no history    SMOKING CESSATION  Patient is not currently using tobacco products  - Quit Date: 1999  ___________________________________________________________________  PATIENT EDUCATION/GOALS  - Discussed asthma rescue inhalers, patient states she \"rarely\" needs to use her rescue inhaler and feels she is well managed.  - Answered all patient questions and concerns to best of my ability  - Introduced post-discharge pharmacy team follow up and benefits  _______________________________________________________________________  RECOMMENDATIONS/PLAN  Continue all medications per medical team  Please send prescriptions to [facility] pharmacy for assistance on insurance prior authorization and copay. Prescriptions will be delivered to the patient's bedside prior to discharge with the Meds to Beds program.   Continuity of care will be provided by PCP and clinical pharmacy team      Marcia Monson PharmD    Verbal consent to manage patient's drug therapy was obtained from the patient. They were informed they may decline to participate or withdraw from " participation in pharmacy services at any time.

## 2024-09-16 NOTE — PROGRESS NOTES
Pharmacy Medication History Review   Spoke to the patient, only change, pt only takes 1/2 tab of Chlorthalidone    Thuy Elizondo is a 59 y.o. female admitted for Right upper quadrant abdominal pain. Pharmacy reviewed the patient's [id number] medications and allergies for accuracy.    The list below reflectives the updated PTA list. Please review each medication in order reconciliation for additional clarification and justification.   The following updates were made to the Prior to Admission medication list:     Source of Information:     Medications ADDED:     Medications CHANGED:  Chlorthalidone 12.5 mg (1/2 tab)  Medications REMOVED:   Lipitor, never took  Medications NOT TAKING:       Allergy reviewed : Yes    Comments:     Prior to Admission Medications   Prescriptions Last Dose Informant   albuterol 90 mcg/actuation aerosol powdr breath activated inhaler     Sig: Inhale 1 puff every 4 hours if needed.   chlorthalidone (Hygroton) 25 mg tablet 9/15/2024    Sig: Take 1/2 (one-half) tablet by mouth once daily   Patient taking differently: Take 0.5 tablets (12.5 mg) by mouth once daily. 1/2 tab   ferrous sulfate, dried (Slow Release Iron) 160 mg (50 mg iron) ER tablet 9/15/2024    Sig: Take 1 tablet (47.5 mg) by mouth once daily.   levothyroxine (Synthroid, Levoxyl) 100 mcg tablet 9/15/2024    Sig: Take 1 tablet (100 mcg) by mouth once daily.   levothyroxine (Synthroid, Levoxyl) 100 mcg tablet Not Taking    Sig: Take 1 tablet by mouth once daily   Patient not taking: Reported on 9/16/2024   metoprolol succinate XL (Toprol-XL) 100 mg 24 hr tablet 9/15/2024    Sig: Take 1 tablet by mouth once daily   venlafaxine XR (Effexor-XR) 150 mg 24 hr capsule 9/14/2024 at pm    Sig: Take 1 capsule by mouth at bedtime      Facility-Administered Medications: None       The list below reflectives the updated allergy list. Please review each documented allergy for additional clarification and justification.  Allergies   Reviewed by Vic Barillas MD on 9/16/2024        Severity Reactions Comments    Animal Dander Not Specified Unknown     Egg Not Specified Unknown             Below are additional concerns with the patient's PTA list.      Irais Dai

## 2024-09-16 NOTE — ANESTHESIA PREPROCEDURE EVALUATION
Patient: Thuy Elizondo    Procedure Information       Date/Time: 09/16/24 0935    Procedure: Cholecystectomy Laparoscopy    Location: [facility] OR 17 / [facility] OR    Surgeons: Jesus Mendiola MD            Relevant Problems   Anesthesia   (+) PONV (postoperative nausea and vomiting)      Cardiac   (+) Benign diastolic hypertension   (+) Hyperlipidemia      Pulmonary  Env allergies  Ex smoker 25y ago   (+) Asthma (HHS-HCC)   (+) Pneumonia (remote)      Neuro   (+) Anxiety   (+) Depression   (+) Sciatica of left side      GI   (+) GERD (gastroesophageal reflux disease)      /Renal (within normal limits)      Liver   (+) Elevated liver function tests   (+) Symptomatic cholelithiasis      Endocrine   (+) Hypothyroidism   (+) Obesity      Hematology   (+) Iron deficiency anemia      Musculoskeletal   (+) Primary osteoarthritis of left knee      ID   (+) Thrush      Skin   (+) Eczema       Clinical information reviewed:    Allergies   Problems    OB Status           NPO Detail:  No data recorded     Physical Exam    Airway  Mallampati: III     Cardiovascular    Dental    Pulmonary    Abdominal            Anesthesia Plan    History of general anesthesia?: yes  History of complications of general anesthesia?: no    ASA 3     general     intravenous induction   Anesthetic plan and risks discussed with patient.

## 2024-09-17 ENCOUNTER — PHARMACY VISIT (OUTPATIENT)
Dept: PHARMACY | Facility: CLINIC | Age: 59
End: 2024-09-17
Payer: MEDICAID

## 2024-09-17 VITALS
WEIGHT: 210.1 LBS | OXYGEN SATURATION: 97 % | RESPIRATION RATE: 16 BRPM | BODY MASS INDEX: 37.23 KG/M2 | TEMPERATURE: 97.4 F | DIASTOLIC BLOOD PRESSURE: 72 MMHG | HEART RATE: 77 BPM | HEIGHT: 63 IN | SYSTOLIC BLOOD PRESSURE: 157 MMHG

## 2024-09-17 LAB
ALBUMIN SERPL BCP-MCNC: 3.6 G/DL (ref 3.4–5)
ALP SERPL-CCNC: 99 U/L (ref 33–110)
ALT SERPL W P-5'-P-CCNC: 291 U/L (ref 7–45)
ANION GAP SERPL CALC-SCNC: 13 MMOL/L (ref 10–20)
AST SERPL W P-5'-P-CCNC: 79 U/L (ref 9–39)
BILIRUB SERPL-MCNC: 0.6 MG/DL (ref 0–1.2)
BUN SERPL-MCNC: 9 MG/DL (ref 6–23)
CALCIUM SERPL-MCNC: 8.4 MG/DL (ref 8.6–10.3)
CHLORIDE SERPL-SCNC: 103 MMOL/L (ref 98–107)
CO2 SERPL-SCNC: 26 MMOL/L (ref 21–32)
CREAT SERPL-MCNC: 0.51 MG/DL (ref 0.5–1.05)
EGFRCR SERPLBLD CKD-EPI 2021: >90 ML/MIN/1.73M*2
ERYTHROCYTE [DISTWIDTH] IN BLOOD BY AUTOMATED COUNT: 13.7 % (ref 11.5–14.5)
GLUCOSE SERPL-MCNC: 110 MG/DL (ref 74–99)
HCT VFR BLD AUTO: 43.2 % (ref 36–46)
HGB BLD-MCNC: 13.4 G/DL (ref 12–16)
MCH RBC QN AUTO: 28.3 PG (ref 26–34)
MCHC RBC AUTO-ENTMCNC: 31 G/DL (ref 32–36)
MCV RBC AUTO: 91 FL (ref 80–100)
NRBC BLD-RTO: 0 /100 WBCS (ref 0–0)
PLATELET # BLD AUTO: 238 X10*3/UL (ref 150–450)
POTASSIUM SERPL-SCNC: 3.6 MMOL/L (ref 3.5–5.3)
PROT SERPL-MCNC: 6.1 G/DL (ref 6.4–8.2)
RBC # BLD AUTO: 4.73 X10*6/UL (ref 4–5.2)
SODIUM SERPL-SCNC: 138 MMOL/L (ref 136–145)
WBC # BLD AUTO: 10.2 X10*3/UL (ref 4.4–11.3)

## 2024-09-17 PROCEDURE — G0378 HOSPITAL OBSERVATION PER HR: HCPCS

## 2024-09-17 PROCEDURE — 85027 COMPLETE CBC AUTOMATED: CPT | Performed by: SURGERY

## 2024-09-17 PROCEDURE — 96376 TX/PRO/DX INJ SAME DRUG ADON: CPT

## 2024-09-17 PROCEDURE — 2500000001 HC RX 250 WO HCPCS SELF ADMINISTERED DRUGS (ALT 637 FOR MEDICARE OP): Performed by: SURGERY

## 2024-09-17 PROCEDURE — RXMED WILLOW AMBULATORY MEDICATION CHARGE

## 2024-09-17 PROCEDURE — 2500000004 HC RX 250 GENERAL PHARMACY W/ HCPCS (ALT 636 FOR OP/ED): Performed by: SURGERY

## 2024-09-17 PROCEDURE — 84075 ASSAY ALKALINE PHOSPHATASE: CPT

## 2024-09-17 PROCEDURE — 36415 COLL VENOUS BLD VENIPUNCTURE: CPT

## 2024-09-17 RX ORDER — OXYCODONE HYDROCHLORIDE 5 MG/1
5 TABLET ORAL EVERY 6 HOURS PRN
Qty: 8 TABLET | Refills: 0 | Status: SHIPPED | OUTPATIENT
Start: 2024-09-17 | End: 2024-09-19 | Stop reason: ALTCHOICE

## 2024-09-17 RX ORDER — CHLORTHALIDONE 25 MG/1
12.5 TABLET ORAL DAILY
Start: 2024-09-17

## 2024-09-17 RX ORDER — ONDANSETRON 4 MG/1
4 TABLET, FILM COATED ORAL EVERY 8 HOURS PRN
Qty: 20 TABLET | Refills: 0 | Status: SHIPPED | OUTPATIENT
Start: 2024-09-17

## 2024-09-17 ASSESSMENT — COGNITIVE AND FUNCTIONAL STATUS - GENERAL
DAILY ACTIVITIY SCORE: 24
MOBILITY SCORE: 24

## 2024-09-17 ASSESSMENT — PAIN DESCRIPTION - LOCATION: LOCATION: ABDOMEN

## 2024-09-17 ASSESSMENT — PAIN SCALES - GENERAL
PAINLEVEL_OUTOF10: 0 - NO PAIN
PAINLEVEL_OUTOF10: 3
PAINLEVEL_OUTOF10: 1

## 2024-09-17 ASSESSMENT — PAIN - FUNCTIONAL ASSESSMENT
PAIN_FUNCTIONAL_ASSESSMENT: 0-10

## 2024-09-17 ASSESSMENT — PAIN SCALES - WONG BAKER: WONGBAKER_NUMERICALRESPONSE: HURTS LITTLE BIT

## 2024-09-17 NOTE — DISCHARGE SUMMARY
Inpatient Discharge Summary    BRIEF OVERVIEW  Admitting Provider: Vic Barillas MD  Discharge Provider: Vic Barillas MD  Primary Care Physician at Discharge: Renetta Coffman [phone]     Treatment Team:   Attending Provider: Vic Barillas MD  Consulting Physician: Jason Montalvo MD  Surgeon: Jesus Mendiola MD  Anesthesiologist: Andre Bermudez MD  Respiratory Therapist: Josey Singh, RRT  Registered Nurse: Oren Grubbs RN  Transitional Care Coordinator: Ziggy Grimm RN  Licensed Practical Nurse: Kylie Gauthier LPN  Physician Assistant: Mejia Kimball PA-C      Admission Date: 9/16/2024     Discharge Date: 9/17/2024    Primary Discharge Diagnosis  Principal Problem:    Right upper quadrant abdominal pain  Active Problems:    Obesity    Elevated liver function tests    PONV (postoperative nausea and vomiting)    Asthma (HHS-HCC)    Pneumonia    Symptomatic cholelithiasis               Discharge Disposition  Final discharge disposition not confirmed  Code Status at Discharge: full      Active Issues Requiring Follow-up  Issue: hospital follow up, general health maintenance and medication refills.    Responsible Individual: pcp  What is Needed: follow up   Follow-up Appointments Arranged: No     Issue: post op follow up   Responsible Individual: Dr Mendiola, surgery   What is Needed: follow up   Follow-up Appointments Arranged: No     Outpatient Follow-Up  Future Appointments   Date Time Provider Department Center   9/19/2024  1:00 PM Renetta Coffman MD [id number] [facility]   9/24/2024 12:30 PM Leesa Ott OT [id number] [facility]   10/8/2024 12:30 PM Leesa Ott OT [id number] [facility]   10/22/2024 12:30 PM Leesa Ott OT [id number] [facility]   11/7/2024 12:00 PM Renetta Coffman MD [id number] [facility]           Test Results Pending at Discharge  Pending Labs       Order Current Status    Urinalysis with Reflex Microscopic Collected (09/16/24 0241)    Surgical Pathology Exam In process                   DETAILS OF HOSPITAL STAY    Presenting Problem/History of Present Illness  Obstruction of bile duct (CMS-HCC) [K83.1]  Elevated blood pressure reading [R03.0]  Symptomatic cholelithiasis [K80.20]  Right upper quadrant abdominal pain [R10.11]      History Of Present Illness  Thuy Elizondo is a 59 y.o. female presenting with episdoes of rt sided abdo pain and nausea over past few days and it was getting worse so came to ED and noted to have cholecystitis  Did have elevated ast and alt  Seen surgery admitted to medicine and did have lap choli today   Presenly doing ok  Pain present but controlled  No fever  No hchills    Hospital Course          Assessment & Plan  Right upper quadrant abdominal pain     Obesity     Elevated liver function tests     PONV (postoperative nausea and vomiting)     Asthma ([facility])     Pneumonia     Symptomatic cholelithiasis      S/p chli  Input from surgery noted  Tolerating diet      Stable for dc  Follow up as out patient          Your medication list        START taking these medications        Instructions Last Dose Given Next Dose Due   ondansetron 4 mg tablet  Commonly known as: Zofran      Take 1 tablet (4 mg) by mouth every 8 hours if needed for nausea or vomiting.       oxyCODONE 5 mg immediate release tablet  Commonly known as: Roxicodone      Take 1 tablet (5 mg) by mouth every 6 hours if needed for severe pain (7 - 10).              CHANGE how you take these medications        Instructions Last Dose Given Next Dose Due   levothyroxine 100 mcg tablet  Commonly known as: Synthroid, Levoxyl  What changed: Another medication with the same name was removed. Continue taking this medication, and follow the directions you see here.      Take 1 tablet (100 mcg) by mouth once daily.              CONTINUE taking these medications        Instructions Last Dose Given Next Dose Due   albuterol 90 mcg/actuation aerosol [facility] breath activated inhaler           chlorthalidone 25 mg  tablet  Commonly known as: Hygroton      Take 0.5 tablets (12.5 mg) by mouth once daily. 1/2 tab       metoprolol succinate  mg 24 hr tablet  Commonly known as: Toprol-XL      Take 1 tablet by mouth once daily       Slow Release Iron 160 mg (50 mg iron) ER tablet  Generic drug: ferrous sulfate, dried           venlafaxine  mg 24 hr capsule  Commonly known as: Effexor-XR      Take 1 capsule by mouth at bedtime                 Where to Get Your Medications        These medications were sent to [facility]  [address] , [address], [place] [id number]      Hours: 8 AM to 6 PM Mon-Fri, 9 AM to 1 PM Saturday Phone: [phone]   ondansetron 4 mg tablet  oxyCODONE 5 mg immediate release tablet       Information about where to get these medications is not yet available    Ask your nurse or doctor about these medications  chlorthalidone 25 mg tablet              Physical Exam at Discharge  Discharge Condition: good  Heart Rate: 77  Resp: 16  BP: 157/72  Temp: 36.3 °C (97.4 °F)  Weight: 95.3 kg (210 lb 1.6 oz)  agree [facility] discharge summary , reviewd and dw NP  Vic Barillas MD  Time > 30 min in discharge planning

## 2024-09-17 NOTE — CARE PLAN
Problem: Pain  Goal: Takes deep breaths with improved pain control throughout the shift  Outcome: Progressing  Goal: Turns in bed with improved pain control throughout the shift  Outcome: Progressing  Goal: Walks with improved pain control throughout the shift  Outcome: Progressing  Goal: Performs ADL's with improved pain control throughout shift  Outcome: Progressing  Goal: Participates in PT with improved pain control throughout the shift  Outcome: Progressing  Goal: Free from opioid side effects throughout the shift  Outcome: Progressing  Goal: Free from acute confusion related to pain meds throughout the shift  Outcome: Progressing     Problem: Pain - Adult  Goal: Verbalizes/displays adequate comfort level or baseline comfort level  Outcome: Progressing     Problem: Safety - Adult  Goal: Free from fall injury  Outcome: Progressing     Problem: Discharge Planning  Goal: Discharge to home or other facility with appropriate resources  Outcome: Progressing     Problem: Chronic Conditions and Co-morbidities  Goal: Patient's chronic conditions and co-morbidity symptoms are monitored and maintained or improved  Outcome: Progressing   The patient's goals for the shift include      The clinical goals for the shift include Vs, pain control, ambulation, remain safe and free of falls.

## 2024-09-17 NOTE — DISCHARGE INSTRUCTIONS
Instructions After Gallbladder Surgery    Wound Care:   -Ice packs to wounds every hour the first day  -Ok to shower in 24 hours  -no baths or swimming for 2 weeks  -keep wound clean and dry  -do not apply topical creams or ointments  -call if you notice redness around wound, foul-smelling drainage, or increasing pain     Diet:   -Avoid greasy, fatty foods first few weeks   -Chetek, soft meals first day or two after surgery    Activity   -Take it easy for the first 48 hours   -stairs and walks are fine   -resume activities gradually over the first week   -ask Dr Menidola before resuming strenuous physical exertions   -No driving while on pain medication    Medications   -Pain medicine prescription attached for severe pain   -You can also take Motrin/Ibuprofen 400mg every 6 hours for mild pain   -Resume your home medications unless otherwise directed   -To avoid constipation please take Colace 100mg twice a day (over the counter)    Other Instructions   -Call to make appointment within 1-2 days:  [phone]   -Call the doctor for the following:  severe unrelieved pain  fevers > 101F  Nausea/vomiting  wound issues  insurance/return to work forms  shortness of breath  chest pains   -Feedback on your surgical experience is appreciated!

## 2024-09-17 NOTE — POST-PROCEDURE NOTE
"Patient is POD 0 Cholecystectomy Laparoscopy Findings: Gallbladder inflamed, full of stones.  Cholangiogram looked okay with Dr. Mendiola.     Patient is awake in bed this evening. Reports pain is well controlled. Tolerating diet. Denies nausea or vomiting.    PE:  Constitutional: A&Ox3, calm and cooperative  Head/Neck: Neck supple, no JVD  Cardiovascular: RRR  Respiratory/Thorax: Non labored breathing on RA  Gastrointestinal: Abdomen nondistended, soft, nontender. Lap sites C/D/I, edges well approximated, covered in Dermabond  Genitourinary: Voiding independently   Musculoskeletal: ROM intact, no joint swelling, normal strength  Extremities: CORONA, No peripheral edema  Neurological: No focal deficits   Psychological: Appropriate mood and behavior  Skin: Warm and dry.     /87 (BP Location: Right arm, Patient Position: Sitting)   Pulse 81   Temp 36.3 °C (97.3 °F) (Temporal)   Resp 14   Ht 1.6 m (5' 3\")   Wt 95.3 kg (210 lb 1.6 oz)   LMP 08/01/2022   SpO2 92%   BMI 37.22 kg/m²    Radiology and labs reviewed. VSS, afebrile.    Plan:   - Diet: low fat   - Continue current pain regimen   - PRN antiemetic   - DVT Proph: SCDs/ ambulate/ heparin  - Bowel regimen: miralax  - Monitor VS every 4 hours   - Labs ordered for AM   - IS every hour while awake   - Encourage ambulation / OOB as tolerated   - Instructed on post operative care, s/s of infection  - Monitor lap sites for drainage, erythema, excessive bruising   - Continue supportive care  - F/u with Dr. Mendiola outpatient in 10-14 days once d/c from hospital     Dispo: Patient doing well post-operatively anticipate discharge tomorrow pending labs.    Total time spent 25 minutes, and greater than 50% of time was spent in counseling/coordination of care     "

## 2024-09-17 NOTE — PROGRESS NOTES
Thuy Elizondo is a 59 y.o. female on day 1 of admission presenting with Right upper quadrant abdominal pain.      Subjective   Pt resting in chair  Feels well  Nad  No complaints         Objective     Last Recorded Vitals  /71 (BP Location: Right arm, Patient Position: Lying)   Pulse 81   Temp 36.4 °C (97.6 °F) (Oral)   Resp 18   Wt 95.3 kg (210 lb 1.6 oz)   SpO2 92%   Intake/Output last 3 Shifts:    Intake/Output Summary (Last 24 hours) at 9/17/2024 1130  Last data filed at 9/17/2024 0900  Gross per 24 hour   Intake 600 ml   Output --   Net 600 ml       Admission Weight  Weight: 95.3 kg (210 lb) (09/15/24 2312)    Daily Weight  09/16/24 : 95.3 kg (210 lb 1.6 oz)    Image Results  ECG 12 lead  Normal sinus rhythm  Normal ECG  No previous ECGs available  See ED provider note for full interpretation and clinical correlation  Confirmed by Serina Fraser ([id number]) on 9/16/2024 9:39:22 PM  FL GI cholangiography intraop  These images are not reportable by radiology and will not be interpreted   by  Radiologists.  US gallbladder  Narrative: STUDY:  Right Upper Quadrant Ultrasound; 1:57 AM  INDICATION:  Right upper quadrant pain for 5 hours.  COMPARISON:  None Available.  ACCESSION NUMBER(S):  [id number]  ORDERING CLINICIAN:  TD CASTILLO  TECHNIQUE:  Ultrasound of the Right Upper Quadrant.  FINDINGS:  LIVER:  The liver is slightly enlarged and demonstrates increased  echogenicity.       GALLBLADDER:  The gallbladder is distended and contains multiple stones with a few  echogenic foci seen in the gallbladder wall.  There is no  pericholecystic fluid however there is mild gallbladder wall  thickening.  Sonographic Campos's sign is negative.        BILE DUCTS:  The common bile duct is dilated and measures 0.7 cm.  There is no  intrahepatic biliary dilatation.       PANCREAS:  The pancreas demonstrates a normal homogeneous echotexture with the  tail not well seen due to overlying bowel gas.  The  pancreatic duct  measures 0.3 cm.     RIGHT KIDNEY:  The right kidney measures 11.3 cm in length.  Renal cortical  echotexture is normal.  There is no hydronephrosis.  There are no  stones.  There are no cysts.  Impression: Increased hepatic parenchymal echotexture, most commonly indicating  hepatic steatosis.  Distended gallbladder with mild gallbladder wall thickening, and  cholelithiasis with a mildly dilated common bile duct but no evidence  of pericholecystic fluid.  Negative sonographic Campos's sign.   Findings suggest acute cholecystitis in the appropriate clinical  setting, correlate with biliary lab values.  Signed by Magen Licea      Physical Exam      Well developed well nurished  No distress  Ao3  Face symmetrical   Neck no jvd no bruit  Chest clear  CVS regular  Ext no edema  Abdo soft nontender bs active, no masses she is postop   Cns alert appropriate able to move ext   Skin intact  Psych normal affect    Has several lap site to abdomin  Durbamond in place  C/d/I  No tenderness    Relevant Results             No current facility-administered medications on file prior to encounter.     Current Outpatient Medications on File Prior to Encounter   Medication Sig Dispense Refill    chlorthalidone (Hygroton) 25 mg tablet Take 1/2 (one-half) tablet by mouth once daily (Patient taking differently: Take 0.5 tablets (12.5 mg) by mouth once daily. 1/2 tab) 45 tablet 0    ferrous sulfate, dried (Slow Release Iron) 160 mg (50 mg iron) ER tablet Take 1 tablet (47.5 mg) by mouth once daily.      levothyroxine (Synthroid, Levoxyl) 100 mcg tablet Take 1 tablet (100 mcg) by mouth once daily. 90 tablet 0    metoprolol succinate XL (Toprol-XL) 100 mg 24 hr tablet Take 1 tablet by mouth once daily 90 tablet 0    venlafaxine XR (Effexor-XR) 150 mg 24 hr capsule Take 1 capsule by mouth at bedtime 90 capsule 0    albuterol 90 mcg/actuation aerosol [facility] breath activated inhaler Inhale 1 puff every 4 hours if needed.       levothyroxine (Synthroid, Levoxyl) 100 mcg tablet Take 1 tablet by mouth once daily (Patient not taking: Reported on 9/16/2024) 90 tablet 0    [DISCONTINUED] atorvastatin (Lipitor) 10 mg tablet Take by mouth.         Results for orders placed or performed during the hospital encounter of 09/16/24 (from the past 24 hour(s))   CBC   Result Value Ref Range    WBC 10.2 4.4 - 11.3 x10*3/uL    nRBC 0.0 0.0 - 0.0 /100 WBCs    RBC 4.73 4.00 - 5.20 x10*6/uL    Hemoglobin 13.4 12.0 - 16.0 g/dL    Hematocrit 43.2 36.0 - 46.0 %    MCV 91 80 - 100 fL    MCH 28.3 26.0 - 34.0 pg    MCHC 31.0 (L) 32.0 - 36.0 g/dL    RDW 13.7 11.5 - 14.5 %    Platelets 238 150 - 450 x10*3/uL   Comprehensive Metabolic Panel   Result Value Ref Range    Glucose 110 (H) 74 - 99 mg/dL    Sodium 138 136 - 145 mmol/L    Potassium 3.6 3.5 - 5.3 mmol/L    Chloride 103 98 - 107 mmol/L    Bicarbonate 26 21 - 32 mmol/L    Anion Gap 13 10 - 20 mmol/L    Urea Nitrogen 9 6 - 23 mg/dL    Creatinine 0.51 0.50 - 1.05 mg/dL    eGFR >90 >60 mL/min/1.73m*2    Calcium 8.4 (L) 8.6 - 10.3 mg/dL    Albumin 3.6 3.4 - 5.0 g/dL    Alkaline Phosphatase 99 33 - 110 U/L    Total Protein 6.1 (L) 6.4 - 8.2 g/dL    AST 79 (H) 9 - 39 U/L    Bilirubin, Total 0.6 0.0 - 1.2 mg/dL     (H) 7 - 45 U/L         Assessment/Plan                  Assessment & Plan  Right upper quadrant abdominal pain    Obesity    Elevated liver function tests    PONV (postoperative nausea and vomiting)    Asthma (HHS-HCC)    Pneumonia    Symptomatic cholelithiasis     S/p chli  Input from surgery noted  Tolerating diet     Stable for dc  Follow up as out patient     -Continue current treatment as ordered. Will make adjustments as necessary.    VTE Prophylaxis  -See Orders    Plan of care discussed with: Provider, RN, Patient.           Patient case and plan of care discussed with Dr. AVA Barillas.    ANTONY Ayon - CNP  -In collaboration with Dr. AVA Barillas    [facility]  [employer].  Office: [phone]  Fax: [phone]  I have reviewed the above note obtained and documented by the NP/PA and I personally participated in the key components. I have discussed the case and management of the patient's care. Changes made to the note, and all key components of history and physical/progress note done by me.  dw nursing  Vic Barillas MD

## 2024-09-17 NOTE — H&P
History Of Present Illness  Thuy Elizondo is a 59 y.o. female presenting with episdoes of rt sided abdo pain and nausea over past few days and it was getting worse so came to ED and noted to have cholecystitis  Did have elevated ast and alt  Seen surgery admitted to medicine and did have lap choli today   Presenly doing ok  Pain present but controlled  No fever  No hchills     Past Medical History  She has a past medical history of Abnormal uterine bleeding (2015), Depression, unspecified (2014), Hypothyroidism, unspecified (2014), and Personal history of diseases of the blood and blood-forming organs and certain disorders involving the immune mechanism (2014).    Surgical History  She has a past surgical history that includes  section, classic (2014); Carpal tunnel release (2014); and Tonsillectomy (2014).     Social History  She reports that she quit smoking about 25 years ago. Her smoking use included cigarettes. She has never used smokeless tobacco. She reports current alcohol use. She reports that she does not use drugs.    Family History  Family History   Problem Relation Name Age of Onset    Hypertension Mother      Diabetes Father          Allergies  Animal dander and Egg    Review of Systems   No chest pain   No shortness of breath  No cough  No nausea vomitign or constipation   No abdo pain   No wt loss  No change in urine     Physical Exam     Well developed well nurished  No distress  Ao3  Face symmetrical   Neck no jvd no bruit  Chest clear  CVS regular  Ext no edema  Abdo soft nontender bs active, no masses she is postop   Cns alert appropriate able to move ext   Skin intact  Psych normal affect    Last Recorded Vitals  /69 (BP Location: Right arm, Patient Position: Lying)   Pulse 83   Temp 36.1 °C (97 °F) (Oral)   Resp 18   Wt 95.3 kg (210 lb 1.6 oz)   SpO2 94%     Relevant Results  Scheduled medications  chlorthalidone, 12.5 mg, oral,  Daily  famotidine, 20 mg, oral, BID   Or  famotidine, 20 mg, intravenous, BID  heparin (porcine), 5,000 Units, subcutaneous, q8h ABHAY  ketorolac, 15 mg, intravenous, q6h  levothyroxine, 100 mcg, oral, Daily  metoprolol succinate XL, 100 mg, oral, Daily  polyethylene glycol, 17 g, oral, Daily  sennosides, 2 tablet, oral, BID  venlafaxine XR, 150 mg, oral, Nightly      Continuous medications     PRN medications  PRN medications: acetaminophen **OR** acetaminophen **OR** acetaminophen, acetaminophen **OR** [DISCONTINUED] acetaminophen **OR** [DISCONTINUED] acetaminophen, albuterol, guaiFENesin, HYDROmorphone, ondansetron **OR** ondansetron, oxyCODONE-acetaminophen, prochlorperazine **OR** prochlorperazine **OR** [DISCONTINUED] prochlorperazine  Results for orders placed or performed during the hospital encounter of 09/16/24 (from the past 96 hour(s))   CBC and Auto Differential   Result Value Ref Range    WBC 7.9 4.4 - 11.3 x10*3/uL    nRBC 0.0 0.0 - 0.0 /100 WBCs    RBC 4.69 4.00 - 5.20 x10*6/uL    Hemoglobin 13.5 12.0 - 16.0 g/dL    Hematocrit 42.5 36.0 - 46.0 %    MCV 91 80 - 100 fL    MCH 28.8 26.0 - 34.0 pg    MCHC 31.8 (L) 32.0 - 36.0 g/dL    RDW 13.4 11.5 - 14.5 %    Platelets 232 150 - 450 x10*3/uL    Neutrophils % 71.9 40.0 - 80.0 %    Immature Granulocytes %, Automated 0.5 0.0 - 0.9 %    Lymphocytes % 21.5 13.0 - 44.0 %    Monocytes % 6.0 2.0 - 10.0 %    Eosinophils % 0.0 0.0 - 6.0 %    Basophils % 0.1 0.0 - 2.0 %    Neutrophils Absolute 5.70 1.20 - 7.70 x10*3/uL    Immature Granulocytes Absolute, Automated 0.04 0.00 - 0.70 x10*3/uL    Lymphocytes Absolute 1.71 1.20 - 4.80 x10*3/uL    Monocytes Absolute 0.48 0.10 - 1.00 x10*3/uL    Eosinophils Absolute 0.00 0.00 - 0.70 x10*3/uL    Basophils Absolute 0.01 0.00 - 0.10 x10*3/uL   Comprehensive metabolic panel   Result Value Ref Range    Glucose 127 (H) 74 - 99 mg/dL    Sodium 139 136 - 145 mmol/L    Potassium 4.0 3.5 - 5.3 mmol/L    Chloride 101 98 - 107 mmol/L     Bicarbonate 29 21 - 32 mmol/L    Anion Gap 13 10 - 20 mmol/L    Urea Nitrogen 15 6 - 23 mg/dL    Creatinine 0.64 0.50 - 1.05 mg/dL    eGFR >90 >60 mL/min/1.73m*2    Calcium 9.2 8.6 - 10.3 mg/dL    Albumin 4.3 3.4 - 5.0 g/dL    Alkaline Phosphatase 123 (H) 33 - 110 U/L    Total Protein 7.1 6.4 - 8.2 g/dL    AST 91 (H) 9 - 39 U/L    Bilirubin, Total 0.5 0.0 - 1.2 mg/dL     (H) 7 - 45 U/L   Magnesium   Result Value Ref Range    Magnesium 2.30 1.60 - 2.40 mg/dL   Lactate   Result Value Ref Range    Lactate 1.3 0.4 - 2.0 mmol/L   Lipase   Result Value Ref Range    Lipase 49 9 - 82 U/L   Troponin I, High Sensitivity   Result Value Ref Range    Troponin I, High Sensitivity 4 0 - 13 ng/L   ECG 12 lead   Result Value Ref Range    Ventricular Rate 69 BPM    Atrial Rate 69 BPM    VT Interval 144 ms    QRS Duration 64 ms    QT Interval 394 ms    QTC Calculation(Bazett) 422 ms    P Axis 51 degrees    R Axis 71 degrees    T Axis 30 degrees    QRS Count 12 beats    Q Onset 224 ms    P Onset 152 ms    P Offset 205 ms    T Offset 421 ms    QTC Fredericia 413 ms     IMPRESSION:  Increased hepatic parenchymal echotexture, most commonly indicating  hepatic steatosis.  Distended gallbladder with mild gallbladder wall thickening, and  cholelithiasis with a mildly dilated common bile duct but no evidence  of pericholecystic fluid.  Negative sonographic Campos's sign.   Findings suggest acute cholecystitis in the appropriate clinical  setting, correlate with biliary lab values.  Signed by Magen Licea         Assessment/Plan   Assessment & Plan  Right upper quadrant abdominal pain    Obesity    Elevated liver function tests    PONV (postoperative nausea and vomiting)    Asthma (HHS-HCC)    Pneumonia    Symptomatic cholelithiasis    Cont with curren t  Surgery following   Postop choli  Dc per surgery   Dw family        Vci Barillas MD

## 2024-09-17 NOTE — PROGRESS NOTES
"Thuy Elizondo is a 59 y.o. female on day 1 of admission presenting with Right upper quadrant abdominal pain.    Assessment/Plan   S/p lap yanelis. OK for discharge later. See me in 2 weeks    Subjective   Doing well. Min pain. Hungry       Objective     Physical Exam  NAD  A&Ox3  Non icteric  CTA  RR  Abdomen soft min tender. Wounds clean, intact  Extremities warm, well perfused     Last Recorded Vitals  Blood pressure 158/71, pulse 81, temperature 36.4 °C (97.6 °F), temperature source Oral, resp. rate 18, height 1.6 m (5' 3\"), weight 95.3 kg (210 lb 1.6 oz), last menstrual period 08/01/2022, SpO2 92%.  Intake/Output last 3 Shifts:  I/O last 3 completed shifts:  In: 840 (8.8 mL/kg) [P.O.:240; I.V.:600 (6.3 mL/kg)]  Out: 15 (0.2 mL/kg) [Blood:15]  Dosing Weight: 95.3 kg     Relevant Results    Scheduled medications  chlorthalidone, 12.5 mg, oral, Daily  famotidine, 20 mg, oral, BID   Or  famotidine, 20 mg, intravenous, BID  heparin (porcine), 5,000 Units, subcutaneous, q8h ABHAY  ketorolac, 15 mg, intravenous, q6h  levothyroxine, 100 mcg, oral, Daily  metoprolol succinate XL, 100 mg, oral, Daily  polyethylene glycol, 17 g, oral, Daily  sennosides, 2 tablet, oral, BID  venlafaxine XR, 150 mg, oral, Nightly      Continuous medications     PRN medications  PRN medications: acetaminophen **OR** acetaminophen **OR** acetaminophen, acetaminophen **OR** [DISCONTINUED] acetaminophen **OR** [DISCONTINUED] acetaminophen, albuterol, guaiFENesin, HYDROmorphone, ondansetron **OR** ondansetron, oxyCODONE-acetaminophen, prochlorperazine **OR** prochlorperazine **OR** [DISCONTINUED] prochlorperazine    Results for orders placed or performed during the hospital encounter of 09/16/24 (from the past 24 hour(s))   CBC   Result Value Ref Range    WBC 10.2 4.4 - 11.3 x10*3/uL    nRBC 0.0 0.0 - 0.0 /100 WBCs    RBC 4.73 4.00 - 5.20 x10*6/uL    Hemoglobin 13.4 12.0 - 16.0 g/dL    Hematocrit 43.2 36.0 - 46.0 %    MCV 91 80 - 100 fL    MCH 28.3 " 26.0 - 34.0 pg    MCHC 31.0 (L) 32.0 - 36.0 g/dL    RDW 13.7 11.5 - 14.5 %    Platelets 238 150 - 450 x10*3/uL   Comprehensive Metabolic Panel   Result Value Ref Range    Glucose 110 (H) 74 - 99 mg/dL    Sodium 138 136 - 145 mmol/L    Potassium 3.6 3.5 - 5.3 mmol/L    Chloride 103 98 - 107 mmol/L    Bicarbonate 26 21 - 32 mmol/L    Anion Gap 13 10 - 20 mmol/L    Urea Nitrogen 9 6 - 23 mg/dL    Creatinine 0.51 0.50 - 1.05 mg/dL    eGFR >90 >60 mL/min/1.73m*2    Calcium 8.4 (L) 8.6 - 10.3 mg/dL    Albumin 3.6 3.4 - 5.0 g/dL    Alkaline Phosphatase 99 33 - 110 U/L    Total Protein 6.1 (L) 6.4 - 8.2 g/dL    AST 79 (H) 9 - 39 U/L    Bilirubin, Total 0.6 0.0 - 1.2 mg/dL     (H) 7 - 45 U/L           I spent 25 minutes in the professional and overall care of this patient.      Jesus Mendiola MD

## 2024-09-17 NOTE — CARE PLAN
The patient's goals for the shift include  pain management    The clinical goals for the shift include Vs, pain control, ambulation, remain safe and free of falls.      Problem: Pain  Goal: Takes deep breaths with improved pain control throughout the shift  Outcome: Progressing     Problem: Pain  Goal: Performs ADL's with improved pain control throughout shift  Outcome: Met     Problem: Discharge Planning  Goal: Discharge to home or other facility with appropriate resources  Outcome: Met

## 2024-09-19 ENCOUNTER — PATIENT OUTREACH (OUTPATIENT)
Dept: PRIMARY CARE | Facility: CLINIC | Age: 59
End: 2024-09-19

## 2024-09-19 ENCOUNTER — APPOINTMENT (OUTPATIENT)
Dept: PRIMARY CARE | Facility: CLINIC | Age: 59
End: 2024-09-19
Payer: MEDICAID

## 2024-09-19 VITALS
BODY MASS INDEX: 36.32 KG/M2 | SYSTOLIC BLOOD PRESSURE: 124 MMHG | WEIGHT: 205 LBS | DIASTOLIC BLOOD PRESSURE: 64 MMHG | HEIGHT: 63 IN | HEART RATE: 80 BPM

## 2024-09-19 DIAGNOSIS — Z09 HOSPITAL DISCHARGE FOLLOW-UP: Primary | ICD-10-CM

## 2024-09-19 DIAGNOSIS — E03.9 ACQUIRED HYPOTHYROIDISM: ICD-10-CM

## 2024-09-19 DIAGNOSIS — Z09 HOSPITAL DISCHARGE FOLLOW-UP: ICD-10-CM

## 2024-09-19 DIAGNOSIS — F41.9 ANXIETY: ICD-10-CM

## 2024-09-19 DIAGNOSIS — R10.11 RIGHT UPPER QUADRANT ABDOMINAL PAIN: ICD-10-CM

## 2024-09-19 DIAGNOSIS — K80.20 SYMPTOMATIC CHOLELITHIASIS: ICD-10-CM

## 2024-09-19 DIAGNOSIS — Z90.49 S/P LAPAROSCOPIC CHOLECYSTECTOMY: ICD-10-CM

## 2024-09-19 DIAGNOSIS — I10 BENIGN DIASTOLIC HYPERTENSION: ICD-10-CM

## 2024-09-19 DIAGNOSIS — F32.89 OTHER DEPRESSION: ICD-10-CM

## 2024-09-19 PROCEDURE — 3078F DIAST BP <80 MM HG: CPT | Performed by: INTERNAL MEDICINE

## 2024-09-19 PROCEDURE — 3008F BODY MASS INDEX DOCD: CPT | Performed by: INTERNAL MEDICINE

## 2024-09-19 PROCEDURE — 3074F SYST BP LT 130 MM HG: CPT | Performed by: INTERNAL MEDICINE

## 2024-09-19 PROCEDURE — 99496 TRANSJ CARE MGMT HIGH F2F 7D: CPT | Performed by: INTERNAL MEDICINE

## 2024-09-19 PROCEDURE — 1036F TOBACCO NON-USER: CPT | Performed by: INTERNAL MEDICINE

## 2024-09-19 NOTE — PROGRESS NOTES
This patient has a follow up scheduled with PCP within 2 business days of DC on 9/19/24.   This visit qualifies for TCM billing. No outreach note done on patient at this time as PCP note during visit will count towards outreach for TCM.    Discharge Facility: [facility]  Discharge Diagnosis: Right upper quadrant abdominal pain, Obstruction of bile duct (CMS-HCC) [K83.1], Elevated blood pressure reading [R03.0], Symptomatic cholelithiasis with lap yanelis.   Admission Date: 9/16/24  Discharge Date: 9/17/24  PCP Appointment Date: Today 9/19/24  Specialist Appointment Date: General Surgeon Dr. Mendiola- 10/3/24  --Hospital Encounter and Summary Linked: Yes--

## 2024-09-19 NOTE — LETTER
September 19, 2024     Patient: Thuy Elizondo   YOB: 1965   Date of Visit: 9/19/2024       To Whom It May Concern:    Thuy Elizondo was seen in my clinic on 9/19/2024 at 1:00 pm. Please excuse Thuy from 09/16/2024 through 09/24/2024 While she recovers from her surgery.      If you have any questions or concerns, please do not hesitate to call my office.         Sincerely,           Renetta Coffman MD        CC: No Recipients

## 2024-09-19 NOTE — PROGRESS NOTES
"Subjective   Patient ID: Thuy Elizondo is a 59 y.o. female who presents for Hospital Follow-up and GI Problem.    HPI   Thuy is a 59-year-old  female who comes today for a follow-up after recent hospitalization from 9/16 through 9/17 for acute cholecystitis/laparoscopic cholecystectomy.  Medications were reviewed and updated in the medical record.  Patient denies any significant abdominal pain, nausea, vomiting, diarrhea, melena, rectal bleeding, fever, chills, headaches, weakness, numbness or genitourinary symptoms.  She has been off work this week and would like to return to work on 9/24.  Patient declines the flu vaccine.  Review of Systems  As per HPI.  Objective   /64 (BP Location: Right arm, Patient Position: Sitting, BP Cuff Size: Large adult)   Pulse 80   Ht 1.6 m (5' 3\")   Wt 93 kg (205 lb)   LMP 08/01/2022   BMI 36.31 kg/m²     Physical Exam  General - Well developed, well appearing, obese middle-aged  female in no acute respiratory distress  Eyes - normal conjunctiva with no pallor or icterus, normal extraocular movements  Neck - No JVD, thyromegaly or lymphadenopathy, tightness noted on left side of neck  Lungs - no respiratory distress and lungs clear to auscultation bilaterally with no rales or wheezes  Heart - normal S1, S2 with normal heart rate, rhythm and no murmurs   Abdomen -lap scars noted, healing well, sluggish bowel sounds heard  Extremities - no cyanosis or pedal edema  Neuro - grossly normal neuro exam with no focal neuro deficits  Psych - normal mental status, mood and affect   Skin -few ecchymosis noted on upper arms, anterior abdominal wall  MSK - normal gait with grossly normal ROM of major joints  Assessment/Plan        1.  Hospital discharge follow-up-patient was hospitalized for cholecystitis and underwent a laparoscopic cholecystectomy, work excuse provided for patient to take off the rest of the week and to return to work on 9/24, patient will " follow-up with surgeon as advised  2.  Hypertension-BP is controlled and patient will concurrent treatment with venlafaxine  3.  Generalized anxiety disorder-stable and as above patient will continue current treatment  4.  Depression-improved and stable and patient will continue current treatment  5.  Hypothyroidism-patient will continue current dose of levothyroxine and labs will be checked during annual wellness exam and follow-up  Follow-up as scheduled for an annual wellness exam and fasting labs.  30 minutes spent rooming the patient, reviewing records, eliciting history, examining patient, counseling, coordination of care and in documentation.  This note was partially generated using the Dragon voice recognition system. There may be some incorrect words, spelling and punctuation errors that were not corrected prior to committing the note to the patient's medical record.

## 2024-09-22 LAB
LABORATORY COMMENT REPORT: NORMAL
PATH REPORT.FINAL DX SPEC: NORMAL
PATH REPORT.GROSS SPEC: NORMAL
PATH REPORT.RELEVANT HX SPEC: NORMAL
PATH REPORT.TOTAL CANCER: NORMAL

## 2024-09-24 ENCOUNTER — TREATMENT (OUTPATIENT)
Dept: OCCUPATIONAL THERAPY | Facility: CLINIC | Age: 59
End: 2024-09-24
Payer: MEDICAID

## 2024-09-24 DIAGNOSIS — M25.532 LEFT WRIST PAIN: ICD-10-CM

## 2024-09-24 DIAGNOSIS — F32.A DEPRESSION, UNSPECIFIED DEPRESSION TYPE: ICD-10-CM

## 2024-09-24 PROCEDURE — 97022 WHIRLPOOL THERAPY: CPT | Mod: GO

## 2024-09-24 PROCEDURE — 97140 MANUAL THERAPY 1/> REGIONS: CPT | Mod: GO

## 2024-09-24 PROCEDURE — 97035 APP MDLTY 1+ULTRASOUND EA 15: CPT | Mod: GO

## 2024-09-24 RX ORDER — VENLAFAXINE HYDROCHLORIDE 150 MG/1
150 CAPSULE, EXTENDED RELEASE ORAL NIGHTLY
Qty: 90 CAPSULE | Refills: 1 | Status: SHIPPED | OUTPATIENT
Start: 2024-09-24

## 2024-09-24 NOTE — PROGRESS NOTES
"Occupational Therapy   Occupational Therapy Treatment    Patient Name: Thuy Elizondo  MRN: [id number]  Today's Date: 9/24/2024  Time Calculation  Start Time: 1235  Stop Time: 1315  Time Calculation (min): 40 min  nsurance:  Visit number: 2  Insurance Type: [employer]  Medicaid   Authorization info: 30 visits then auth    Current Problem  1. Left wrist pain  Follow Up In Occupational Therapy        Precautions     SUBJECTIVE:   Patient reports \"The heat really helps\" Pt reports of continued left wrist pain , increased in morning hours.     Pain:    3-4 /10  Location: Certain movements  Description:     Performing HEP: Yes from evaluation     OBJECTIVE:   No pain reports with manual techs and stretches    Treatment:    Modalities: 15 , 10 Min   Fluidotherapy treatment Left forearm, wrist and hand with AROM x 15   Min   Ultrasound tx:  3 MHZ  1.3 w/cm2 Continuous cycle over Left ulnar border of wrist x 10   Min.     Therapeutic Exercise:  5  min   Added to HEP: Pt completed in clinic: Wrist flexor/extensor stretches    Supination/pronation AAROM/PROM dowel x 5 each direction   Handouts issued    Manual Therapy: 10  min   STM left forearm musculature    CFM ulnar wrist, PROM F/A, wrist all planes    Therapeutic Activity:     min    Neuromuscular Re-education:  min    Orthosis:   min    Wound Care:     min    Self Care/ADL   min    Other Treatment:   min    ASSESSMENT:  Decreased pain reports since IE. Good use and tolerance to tubigrip sleeves.  Patient verbalizes and demonstrates good understanding and technique of upgraded HEP.    PLAN:   Continue with POC. MMT Left UE     Leesa Ott MS, OTR/L [id number]         "

## 2024-09-25 DIAGNOSIS — J45.909 ASTHMA, UNSPECIFIED ASTHMA SEVERITY, UNSPECIFIED WHETHER COMPLICATED, UNSPECIFIED WHETHER PERSISTENT (HHS-HCC): Primary | ICD-10-CM

## 2024-10-03 ENCOUNTER — OFFICE VISIT (OUTPATIENT)
Dept: SURGERY | Facility: CLINIC | Age: 59
End: 2024-10-03
Payer: MEDICAID

## 2024-10-03 ENCOUNTER — PATIENT OUTREACH (OUTPATIENT)
Dept: PRIMARY CARE | Facility: CLINIC | Age: 59
End: 2024-10-03

## 2024-10-03 VITALS
HEART RATE: 76 BPM | SYSTOLIC BLOOD PRESSURE: 129 MMHG | TEMPERATURE: 97.9 F | DIASTOLIC BLOOD PRESSURE: 84 MMHG | BODY MASS INDEX: 36.5 KG/M2 | WEIGHT: 206 LBS | HEIGHT: 63 IN

## 2024-10-03 DIAGNOSIS — Z09 HOSPITAL DISCHARGE FOLLOW-UP: ICD-10-CM

## 2024-10-03 DIAGNOSIS — Z09 SURGERY FOLLOW-UP: Primary | ICD-10-CM

## 2024-10-03 PROCEDURE — 99211 OFF/OP EST MAY X REQ PHY/QHP: CPT | Performed by: SURGERY

## 2024-10-03 PROCEDURE — 3008F BODY MASS INDEX DOCD: CPT | Performed by: SURGERY

## 2024-10-03 PROCEDURE — 3074F SYST BP LT 130 MM HG: CPT | Performed by: SURGERY

## 2024-10-03 PROCEDURE — 3079F DIAST BP 80-89 MM HG: CPT | Performed by: SURGERY

## 2024-10-03 PROCEDURE — 1036F TOBACCO NON-USER: CPT | Performed by: SURGERY

## 2024-10-03 ASSESSMENT — ENCOUNTER SYMPTOMS: DEPRESSION: 0

## 2024-10-03 ASSESSMENT — PAIN SCALES - GENERAL: PAINLEVEL: 0-NO PAIN

## 2024-10-03 NOTE — LETTER
October 3, 2024     Renetta Coffman MD  [address]  [facility] [address]  [facility] [address]    Patient: Thuy Elizondo   YOB: 1965   Date of Visit: 10/3/2024       Dear Dr. Renetta Coffman MD:    Thank you for referring Thuy Elizondo to me for evaluation. Below are my notes for this consultation.  If you have questions, please do not hesitate to call me. I look forward to following your patient along with you.       Sincerely,     Jesus Mendiola MD      CC: No Recipients  ______________________________________________________________________________________    Assessment/Plan  Excellent recovery following recent laparoscopic cholecystectomy  -We reviewed intraoperative findings and final pathology report  -Patient encouraged to resume regular activities including exercise as tolerated  -Avoid greasy and fatty foods first couple months after surgery  -Advised hydrocortisone cream to the dermatitis around the wounds  -Follow-up with me as needed    Subjective  Following up after recent laparoscopic cholecystectomy.  Doing very well.  No complaints.       Objective    Physical Exam  NAD  A&Ox3  Non icteric  CTA  RR  Abdomen soft min tender. Wounds clean, intact.  Mild allergic reaction to the glue  Extremities warm, well perfused         Relevant Results      No results found for this or any previous visit (from the past 24 hour(s)).    evelin 9/16/2024 09:23       Status: Final result       Visible to patient: Yes (seen)       Dx: Right upper quadrant abdominal pain; ...    0 Result Notes       Component  Resulting Agency   FINAL DIAGNOSIS   A. GALLBLADDER CHOLECYSTECTOMY:   -Gallbladder with cholelithiasis and chronic cholecystitis   Electronically signed by Candace Scott MD PhD on 9/22/2024 at [address] [place] [address]   By the signature on this report, the individual or group listed as making the Final Interpretation/Diagnosis certifies that they have reviewed this case.    Clinical History   AM          I spent 25 minutes in the professional and overall care of this patient.      Jesus Mendiola MD

## 2024-10-03 NOTE — PROGRESS NOTES
Assessment/Plan   Excellent recovery following recent laparoscopic cholecystectomy  -We reviewed intraoperative findings and final pathology report  -Patient encouraged to resume regular activities including exercise as tolerated  -Avoid greasy and fatty foods first couple months after surgery  -Advised hydrocortisone cream to the dermatitis around the wounds  -Follow-up with me as needed    Subjective   Following up after recent laparoscopic cholecystectomy.  Doing very well.  No complaints.       Objective     Physical Exam  NAD  A&Ox3  Non icteric  CTA  RR  Abdomen soft min tender. Wounds clean, intact.  Mild allergic reaction to the glue  Extremities warm, well perfused         Relevant Results      No results found for this or any previous visit (from the past 24 hour(s)).    evelin 9/16/2024 09:23       Status: Final result       Visible to patient: Yes (seen)       Dx: Right upper quadrant abdominal pain; ...    0 Result Notes       Component  Resulting Agency   FINAL DIAGNOSIS   A. GALLBLADDER CHOLECYSTECTOMY:   -Gallbladder with cholelithiasis and chronic cholecystitis   Electronically signed by Candace Scott MD PhD on 9/22/2024 at [address] [place] [address]   By the signature on this report, the individual or group listed as making the Final Interpretation/Diagnosis certifies that they have reviewed this case.    Clinical History  AM          I spent 25 minutes in the professional and overall care of this patient.      Jesus Mendiola MD

## 2024-10-03 NOTE — PROGRESS NOTES
TCM 14 day outreach skipped as the patient has a follow up with her surgeon same day. TCM to reassess patient needs 30 days post discharge.

## 2024-10-08 ENCOUNTER — TREATMENT (OUTPATIENT)
Dept: OCCUPATIONAL THERAPY | Facility: CLINIC | Age: 59
End: 2024-10-08
Payer: MEDICAID

## 2024-10-08 DIAGNOSIS — M18.12 ARTHRITIS OF CARPOMETACARPAL (CMC) JOINT OF LEFT THUMB: Primary | ICD-10-CM

## 2024-10-08 DIAGNOSIS — M25.532 LEFT WRIST PAIN: ICD-10-CM

## 2024-10-08 DIAGNOSIS — M19.032 ARTHRITIS OF LEFT WRIST: ICD-10-CM

## 2024-10-08 PROCEDURE — 97035 APP MDLTY 1+ULTRASOUND EA 15: CPT | Mod: GO

## 2024-10-08 PROCEDURE — 97022 WHIRLPOOL THERAPY: CPT | Mod: GO

## 2024-10-08 PROCEDURE — 97110 THERAPEUTIC EXERCISES: CPT | Mod: GO

## 2024-10-17 DIAGNOSIS — J45.909 ASTHMA, UNSPECIFIED ASTHMA SEVERITY, UNSPECIFIED WHETHER COMPLICATED, UNSPECIFIED WHETHER PERSISTENT (HHS-HCC): Primary | ICD-10-CM

## 2024-10-18 ENCOUNTER — PATIENT OUTREACH (OUTPATIENT)
Dept: PRIMARY CARE | Facility: CLINIC | Age: 59
End: 2024-10-18
Payer: MEDICAID

## 2024-10-18 DIAGNOSIS — Z09 HOSPITAL DISCHARGE FOLLOW-UP: ICD-10-CM

## 2024-10-22 ENCOUNTER — APPOINTMENT (OUTPATIENT)
Dept: OCCUPATIONAL THERAPY | Facility: CLINIC | Age: 59
End: 2024-10-22
Payer: MEDICAID

## 2024-11-07 ENCOUNTER — APPOINTMENT (OUTPATIENT)
Dept: PRIMARY CARE | Facility: CLINIC | Age: 59
End: 2024-11-07
Payer: MEDICAID

## 2024-11-07 ENCOUNTER — LAB (OUTPATIENT)
Dept: LAB | Facility: LAB | Age: 59
End: 2024-11-07
Payer: MEDICAID

## 2024-11-07 VITALS
WEIGHT: 206 LBS | HEIGHT: 63 IN | HEART RATE: 69 BPM | DIASTOLIC BLOOD PRESSURE: 70 MMHG | SYSTOLIC BLOOD PRESSURE: 118 MMHG | BODY MASS INDEX: 36.5 KG/M2

## 2024-11-07 DIAGNOSIS — E55.9 VITAMIN D DEFICIENCY: ICD-10-CM

## 2024-11-07 DIAGNOSIS — F32.A DEPRESSION, UNSPECIFIED DEPRESSION TYPE: ICD-10-CM

## 2024-11-07 DIAGNOSIS — I10 BENIGN DIASTOLIC HYPERTENSION: ICD-10-CM

## 2024-11-07 DIAGNOSIS — Z00.00 ANNUAL PHYSICAL EXAM: Primary | ICD-10-CM

## 2024-11-07 DIAGNOSIS — E03.9 ACQUIRED HYPOTHYROIDISM: ICD-10-CM

## 2024-11-07 DIAGNOSIS — Z12.31 SCREENING MAMMOGRAM FOR BREAST CANCER: ICD-10-CM

## 2024-11-07 DIAGNOSIS — J45.20 MILD INTERMITTENT COLD-INDUCED ASTHMA WITHOUT COMPLICATION (HHS-HCC): ICD-10-CM

## 2024-11-07 DIAGNOSIS — Z12.11 ENCOUNTER FOR SCREENING FOR MALIGNANT NEOPLASM OF COLON: ICD-10-CM

## 2024-11-07 DIAGNOSIS — Z00.00 ANNUAL PHYSICAL EXAM: ICD-10-CM

## 2024-11-07 DIAGNOSIS — F41.9 ANXIETY: ICD-10-CM

## 2024-11-07 PROBLEM — M47.812 SPONDYLOSIS WITHOUT MYELOPATHY OR RADICULOPATHY, CERVICAL REGION: Status: ACTIVE | Noted: 2024-11-07

## 2024-11-07 LAB
ALBUMIN SERPL BCP-MCNC: 4.1 G/DL (ref 3.4–5)
ALP SERPL-CCNC: 72 U/L (ref 33–110)
ALT SERPL W P-5'-P-CCNC: 24 U/L (ref 7–45)
ANION GAP SERPL CALC-SCNC: 12 MMOL/L (ref 10–20)
AST SERPL W P-5'-P-CCNC: 18 U/L (ref 9–39)
BILIRUB SERPL-MCNC: 0.8 MG/DL (ref 0–1.2)
BUN SERPL-MCNC: 12 MG/DL (ref 6–23)
CALCIUM SERPL-MCNC: 9.3 MG/DL (ref 8.6–10.6)
CHLORIDE SERPL-SCNC: 102 MMOL/L (ref 98–107)
CHOLEST SERPL-MCNC: 225 MG/DL (ref 0–199)
CHOLESTEROL/HDL RATIO: 4.4
CO2 SERPL-SCNC: 29 MMOL/L (ref 21–32)
CREAT SERPL-MCNC: 0.59 MG/DL (ref 0.5–1.05)
EGFRCR SERPLBLD CKD-EPI 2021: >90 ML/MIN/1.73M*2
ERYTHROCYTE [DISTWIDTH] IN BLOOD BY AUTOMATED COUNT: 13.7 % (ref 11.5–14.5)
EST. AVERAGE GLUCOSE BLD GHB EST-MCNC: 105 MG/DL
GLUCOSE SERPL-MCNC: 83 MG/DL (ref 74–99)
HBA1C MFR BLD: 5.3 %
HCT VFR BLD AUTO: 42.1 % (ref 36–46)
HDLC SERPL-MCNC: 50.6 MG/DL
HGB BLD-MCNC: 13.2 G/DL (ref 12–16)
LDLC SERPL CALC-MCNC: 151 MG/DL
MCH RBC QN AUTO: 29.2 PG (ref 26–34)
MCHC RBC AUTO-ENTMCNC: 31.4 G/DL (ref 32–36)
MCV RBC AUTO: 93 FL (ref 80–100)
NON HDL CHOLESTEROL: 174 MG/DL (ref 0–149)
NRBC BLD-RTO: 0 /100 WBCS (ref 0–0)
PLATELET # BLD AUTO: 233 X10*3/UL (ref 150–450)
POTASSIUM SERPL-SCNC: 3.8 MMOL/L (ref 3.5–5.3)
PROT SERPL-MCNC: 6.6 G/DL (ref 6.4–8.2)
RBC # BLD AUTO: 4.52 X10*6/UL (ref 4–5.2)
SODIUM SERPL-SCNC: 139 MMOL/L (ref 136–145)
TRIGL SERPL-MCNC: 115 MG/DL (ref 0–149)
TSH SERPL-ACNC: 1.68 MIU/L (ref 0.44–3.98)
VLDL: 23 MG/DL (ref 0–40)
WBC # BLD AUTO: 5.5 X10*3/UL (ref 4.4–11.3)

## 2024-11-07 PROCEDURE — 81003 URINALYSIS AUTO W/O SCOPE: CPT

## 2024-11-07 PROCEDURE — 83036 HEMOGLOBIN GLYCOSYLATED A1C: CPT

## 2024-11-07 PROCEDURE — 3074F SYST BP LT 130 MM HG: CPT | Performed by: INTERNAL MEDICINE

## 2024-11-07 PROCEDURE — 80053 COMPREHEN METABOLIC PANEL: CPT

## 2024-11-07 PROCEDURE — 99396 PREV VISIT EST AGE 40-64: CPT | Performed by: INTERNAL MEDICINE

## 2024-11-07 PROCEDURE — 3008F BODY MASS INDEX DOCD: CPT | Performed by: INTERNAL MEDICINE

## 2024-11-07 PROCEDURE — 1036F TOBACCO NON-USER: CPT | Performed by: INTERNAL MEDICINE

## 2024-11-07 PROCEDURE — 84443 ASSAY THYROID STIM HORMONE: CPT

## 2024-11-07 PROCEDURE — 36415 COLL VENOUS BLD VENIPUNCTURE: CPT

## 2024-11-07 PROCEDURE — 3078F DIAST BP <80 MM HG: CPT | Performed by: INTERNAL MEDICINE

## 2024-11-07 PROCEDURE — 85027 COMPLETE CBC AUTOMATED: CPT

## 2024-11-07 PROCEDURE — 80061 LIPID PANEL: CPT

## 2024-11-07 RX ORDER — METOPROLOL SUCCINATE 100 MG/1
100 TABLET, EXTENDED RELEASE ORAL DAILY
Qty: 90 TABLET | Refills: 1 | Status: SHIPPED | OUTPATIENT
Start: 2024-11-07

## 2024-11-07 RX ORDER — VENLAFAXINE HYDROCHLORIDE 150 MG/1
150 CAPSULE, EXTENDED RELEASE ORAL NIGHTLY
Qty: 90 CAPSULE | Refills: 1 | Status: SHIPPED | OUTPATIENT
Start: 2024-11-07

## 2024-11-07 RX ORDER — CHLORTHALIDONE 25 MG/1
25 TABLET ORAL DAILY
Qty: 45 TABLET | Refills: 1 | Status: SHIPPED | OUTPATIENT
Start: 2024-11-07

## 2024-11-07 NOTE — PROGRESS NOTES
"Subjective   Patient ID: Thuy Elizondo is a 59 y.o. female who presents for Annual Exam.    HPI   59-year-old  female comes today for an annual wellness exam and lab work.  Pap done in March 2023 was okay and patient sees her gynecologist in regards to this as well as her mammogram.  I have reminded the patient that her mammogram is past due.  Patient is up-to-date on vaccinations including Shingrix.  She is compliant with her medications to which she reports no significant side effects.  Family history is positive for colon cancer in grandmother but patient has not had a colonoscopy in some time.  Patient claims that she has history of cold induced asthma and rarely uses the albuterol inhaler and request refills for this.  Mood is stable on current dose of venlafaxine.  No history of fever, chills, chest pain, shortness of breath, cough, dizziness, palpitations, syncope, abdominal pain, nausea, vomiting, diarrhea, melena, rectal bleeding, loss of weight, loss of appetite, dysuria, hematuria, headaches, weakness or numbness.  She has recovered well from lap cholecystectomy.  Patient has been postmenopausal for a few years and still gets hot flashes on and off.  Review of Systems  As per HPI.  Objective   /70 (BP Location: Right arm, Patient Position: Sitting, BP Cuff Size: Large adult)   Pulse 69   Ht 1.6 m (5' 3\")   Wt 93.4 kg (206 lb)   LMP 08/01/2022   BMI 36.49 kg/m²     Physical Exam  General - Well developed, well appearing, obese middle-aged  female in no acute respiratory distress  Eyes - normal conjunctiva with no pallor or icterus, normal extraocular movements  ENT - normal external auditory canals and tympanic membranes, throat clear with no exudates  Neck - No JVD, thyromegaly or lymphadenopathy  Lungs - no respiratory distress and lungs clear to auscultation bilaterally with no rales or wheezes  Heart - normal S1, S2 with normal heart rate, rhythm and no murmurs   Breasts, " pelvic and pap - per gyn  Abdomen -  soft, nontender with no masses or organomegaly,  Extremities - no cyanosis or pedal edema  Neuro - grossly normal neuro exam with no focal neuro deficits  Psych - normal mental status, mood and affect   Skin -few erythematous macules with excoriations on the left shin, 2 red papules on the right shin   MSK - normal gait with grossly normal ROM of major joints  Assessment/Plan        1.  Annual wellness exam-patient is up-to-date on immunizations, mammogram and colonoscopy will be ordered, routine labs ordered, patient is up-to-date on Pap/pelvic through gynecologist  2.  Depression-stable and patient will continue current dose of venlafaxine  3.  Hypertension-controlled and patient will continue current treatment  4.  Screening for colon cancer in a patient with family history of colon cancer-colonoscopy order placed  5.  Anxiety-stable and patient will continue current dose of venlafaxine  6.  Hypothyroidism-patient is on levothyroxine, TSH will be checked  7.  Vitamin D deficiency-improved, last level was normal  8.  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  This note was partially generated using the Dragon voice recognition system. There may be some incorrect words, spelling and punctuation errors that were not corrected prior to committing the note to the patient's medical record.

## 2024-11-08 LAB
APPEARANCE UR: CLEAR
BILIRUB UR STRIP.AUTO-MCNC: NEGATIVE MG/DL
COLOR UR: YELLOW
GLUCOSE UR STRIP.AUTO-MCNC: NORMAL MG/DL
KETONES UR STRIP.AUTO-MCNC: NEGATIVE MG/DL
LEUKOCYTE ESTERASE UR QL STRIP.AUTO: NEGATIVE
NITRITE UR QL STRIP.AUTO: NEGATIVE
PH UR STRIP.AUTO: 7.5 [PH]
PROT UR STRIP.AUTO-MCNC: NEGATIVE MG/DL
RBC # UR STRIP.AUTO: NEGATIVE /UL
SP GR UR STRIP.AUTO: 1.02
UROBILINOGEN UR STRIP.AUTO-MCNC: NORMAL MG/DL

## 2024-12-04 DIAGNOSIS — E03.9 HYPOTHYROIDISM, UNSPECIFIED TYPE: ICD-10-CM

## 2024-12-04 RX ORDER — LEVOTHYROXINE SODIUM 100 UG/1
100 TABLET ORAL DAILY
Qty: 90 TABLET | Refills: 1 | Status: SHIPPED | OUTPATIENT
Start: 2024-12-04

## 2024-12-05 ENCOUNTER — LAB (OUTPATIENT)
Dept: LAB | Facility: LAB | Age: 59
End: 2024-12-05
Payer: MEDICAID

## 2024-12-05 ENCOUNTER — OFFICE VISIT (OUTPATIENT)
Facility: CLINIC | Age: 59
End: 2024-12-05
Payer: MEDICAID

## 2024-12-05 VITALS
SYSTOLIC BLOOD PRESSURE: 139 MMHG | HEART RATE: 83 BPM | WEIGHT: 202 LBS | BODY MASS INDEX: 35.79 KG/M2 | DIASTOLIC BLOOD PRESSURE: 81 MMHG | HEIGHT: 63 IN

## 2024-12-05 DIAGNOSIS — M17.12 PRIMARY OSTEOARTHRITIS OF LEFT KNEE: ICD-10-CM

## 2024-12-05 DIAGNOSIS — M25.549 PAIN IN MULTIPLE FINGER JOINTS: ICD-10-CM

## 2024-12-05 DIAGNOSIS — M19.032 ARTHRITIS OF LEFT WRIST: Primary | ICD-10-CM

## 2024-12-05 DIAGNOSIS — M47.812 SPONDYLOSIS WITHOUT MYELOPATHY OR RADICULOPATHY, CERVICAL REGION: ICD-10-CM

## 2024-12-05 PROCEDURE — 86200 CCP ANTIBODY: CPT

## 2024-12-05 PROCEDURE — 3075F SYST BP GE 130 - 139MM HG: CPT | Performed by: INTERNAL MEDICINE

## 2024-12-05 PROCEDURE — 1036F TOBACCO NON-USER: CPT | Performed by: INTERNAL MEDICINE

## 2024-12-05 PROCEDURE — 36415 COLL VENOUS BLD VENIPUNCTURE: CPT

## 2024-12-05 PROCEDURE — 86431 RHEUMATOID FACTOR QUANT: CPT

## 2024-12-05 PROCEDURE — 99204 OFFICE O/P NEW MOD 45 MIN: CPT | Performed by: INTERNAL MEDICINE

## 2024-12-05 PROCEDURE — 86140 C-REACTIVE PROTEIN: CPT

## 2024-12-05 PROCEDURE — 3008F BODY MASS INDEX DOCD: CPT | Performed by: INTERNAL MEDICINE

## 2024-12-05 PROCEDURE — 3079F DIAST BP 80-89 MM HG: CPT | Performed by: INTERNAL MEDICINE

## 2024-12-05 PROCEDURE — 85652 RBC SED RATE AUTOMATED: CPT

## 2024-12-05 ASSESSMENT — PAIN SCALES - GENERAL: PAINLEVEL_OUTOF10: 4

## 2024-12-05 NOTE — PROGRESS NOTES
"Subjective . Thuy Elizondo is a 59 y.o. female who presents for Establish Care and Arthritis.    Arthritis    .  59-year-old female with history of anxiety, depression, HTN, dyslipidemia, s/p bilateral CTS surgery and cholecystectomy referred for joint pain evaluation.    She reports chronic intermittent pain in neck, low back, hands, hips, knees and feet.  Knee pain gets worse upon standing up from seated position and moving around.  Hand pain gets worse after ADLs.  She thinks her hands are swollen at times in the morning.  She reports locking of the right ring finger.  She states left hand pain has improved after OT that she recently finished.      Mother has knee replacement surgery and father has hip replacement surgery.    Social history: She is .  She does not smoke or drink.  She works as a pharmacy tech.  Surgical history: Bilateral carpal tunnel syndrome surgery and cholecystectomy.    She has no history of psoriasis, inflammatory bowel disease, iritis, uveitis, Raynaud's phenomena, alopecia, sicca symptoms and mucocutaneous ulceration.    Left knee x-ray: (1/2024).  -Moderate osteoarthritis left worst medially.     Left hand x-ray: (7/2024).  -Degenerative change in the 1st CMC and triscaphe joints.     Cervical spine x-ray: (12/2023).  -Fairly advanced degenerative change C5 through C7.    Lumbar spine x-ray: (10/2020)  -Disc space narrowing of L5-S1. The other lumbar vertebral bodies   demonstrate endplate spurring.     Review of Systems   Musculoskeletal:  Positive for arthritis.   All other systems reviewed and are negative.    Objective     Blood pressure 139/81, pulse 83, height 1.6 m (5' 3\"), weight 91.6 kg (202 lb), last menstrual period 08/01/2022.    Physical Exam.  Gen. AAO x3, NAD.  HEENT: No pallor or icterus, PERRLA, EOMI. Oropharynx is clear. MM moist,Parotid glands  not enlarged. No cervical lymphadenopathy .  Skin: No rashes.  Heart: S1, S2/ RRR. No murmurs or gallops.  Lungs: CTA " B.  Abdomen: Soft, NT/ND, BS regular.  MSK: Bilateral Heberden's nodes.  Right index and middle finger DIP joint with mild tenderness.  Bilateral CMC squaring with positive grinding.  Bilateral wrist without swelling and tenderness.  Bilateral elbows and shoulders without swelling and tenderness.  Neck, spine and SI joint without tenderness.  Bilateral Ricardo's negative.  Bilateral knee with patellofemoral crepitation, left more than right.  Bilateral ankle and MTPs without swelling and tenderness.    Neuro: CN II-XII intact. Sensation to touch intact.Strength 5/5 throughout. DTR 2+ and symmetrical.  Psych:Appropriate mood and behavior  EXT: No edema      Assessment/Plan . 59-year-old female with history of anxiety, depression, HTN, dyslipidemia, s/p bilateral CTS surgery and cholecystectomy presented with chronic intermittent pain in her neck, low back, hands, hips, knees and feet.  After history, physical examination and review of available labs and imaging studies, it appears she most likely has generalized osteoarthritis.  Patient was counseled regarding the diagnosis, management and outcomes in length.  Weight loss discussed in length.  Patient was asked to take Tylenol or over-the-counter NSAIDs as needed.  Lab to complete the workup.     This note was partially generated using the Dragon Voice recognition system. There may be some incorrect wording, spelling and/or spelling errors or punctuation errors that were not corrected prior to committing the note to the medical record.      Problem List Items Addressed This Visit       Pain in multiple finger joints - Primary    Relevant Orders    C-Reactive Protein    Citrulline Antibody, IgG    Rheumatoid Factor    Sedimentation Rate            Active Ambulatory Problems     Diagnosis Date Noted    Anxiety 10/05/2023    Depression 10/05/2023    Eczema 10/05/2023    Hyperlipidemia 10/05/2023    Hypermobility of joint 10/05/2023    Hypothyroidism 10/05/2023    Muscle  tension headache 10/05/2023    Vitamin D deficiency 10/05/2023    Chronic pain of both knees 10/05/2023    Benign diastolic hypertension 10/05/2023    Iron deficiency anemia 10/05/2023    Irregular menses 10/05/2023    Low back pain 10/05/2023    Myalgia 10/05/2023    Primary osteoarthritis of left knee 10/05/2023    Pain in multiple finger joints 10/05/2023    Pelvic pain in female 10/05/2023    Post-menopausal bleeding 10/05/2023    Sciatica of left side 10/05/2023    Thrush 10/05/2023    GERD (gastroesophageal reflux disease) 10/31/2023    History of anemia 2015    Melasma 2010    Wrist pain 2024    Arthritis of left wrist 2024    Right upper quadrant abdominal pain 2024    Symptomatic cholelithiasis 09/15/2024    Obesity 2024    Elevated liver function tests 2024    PONV (postoperative nausea and vomiting) 2024    Asthma 2024    Pneumonia 2024    Spondylosis without myelopathy or radiculopathy, cervical region 2024     Resolved Ambulatory Problems     Diagnosis Date Noted    Abnormal uterine bleeding 2015    Chronic pain of right knee 2018     Past Medical History:   Diagnosis Date    Depression, unspecified 2014    Hypothyroidism, unspecified 2014    Personal history of diseases of the blood and blood-forming organs and certain disorders involving the immune mechanism 2014       Family History   Problem Relation Name Age of Onset    Hypertension Mother      Diabetes Father         Past Surgical History:   Procedure Laterality Date    CARPAL TUNNEL RELEASE  2014    Neuroplasty Decompression Median Nerve At Carpal Tunnel     SECTION, CLASSIC  2014     Section    TONSILLECTOMY  2014    Tonsillectomy       Social History     Tobacco Use   Smoking Status Former    Current packs/day: 0.00    Types: Cigarettes    Quit date:     Years since quittin.9   Smokeless Tobacco Never        Allergies  Animal dander and Egg    Current Meds  Current Outpatient Medications   Medication Instructions    albuterol 90 mcg/actuation aerosol [facility] breath activated inhaler 1 puff, inhalation, Every 4 hours PRN    chlorthalidone (HYGROTON) 25 mg, oral, Daily    ferrous sulfate, dried (Slow Release Iron) 160 mg (50 mg iron) ER tablet 1 tablet, Daily    levothyroxine (SYNTHROID, LEVOXYL) 100 mcg, oral, Daily    metoprolol succinate XL (TOPROL-XL) 100 mg, oral, Daily    venlafaxine XR (EFFEXOR-XR) 150 mg, oral, Nightly        Lab Results   Component Value Date    URICACID 5.8 09/19/2023        Procedures     Henri Bhagat MD

## 2024-12-06 LAB
CCP IGG SERPL-ACNC: 1 U/ML
CRP SERPL-MCNC: 0.52 MG/DL
ERYTHROCYTE [SEDIMENTATION RATE] IN BLOOD BY WESTERGREN METHOD: 10 MM/H (ref 0–30)
RHEUMATOID FACT SER NEPH-ACNC: <10 IU/ML (ref 0–15)

## 2024-12-19 ENCOUNTER — PATIENT OUTREACH (OUTPATIENT)
Dept: PRIMARY CARE | Facility: CLINIC | Age: 59
End: 2024-12-19
Payer: MEDICAID

## 2024-12-19 DIAGNOSIS — Z09 HOSPITAL DISCHARGE FOLLOW-UP: ICD-10-CM

## 2024-12-19 NOTE — PROGRESS NOTES
Paula communication for 90 day TCM post discharge outreach.     Patient has met target of no readmission for 90 days post hospital discharge and is graduated from Transitional Care Management program at this time.

## 2025-01-14 ENCOUNTER — TELEPHONE (OUTPATIENT)
Dept: PRIMARY CARE | Facility: CLINIC | Age: 60
End: 2025-01-14

## 2025-01-14 ENCOUNTER — LAB (OUTPATIENT)
Dept: LAB | Facility: LAB | Age: 60
End: 2025-01-14
Payer: MEDICAID

## 2025-01-14 ENCOUNTER — HOSPITAL ENCOUNTER (OUTPATIENT)
Dept: RADIOLOGY | Facility: CLINIC | Age: 60
Discharge: HOME | End: 2025-01-14
Payer: MEDICAID

## 2025-01-14 ENCOUNTER — OFFICE VISIT (OUTPATIENT)
Dept: PRIMARY CARE | Facility: CLINIC | Age: 60
End: 2025-01-14
Payer: MEDICAID

## 2025-01-14 VITALS — DIASTOLIC BLOOD PRESSURE: 72 MMHG | HEIGHT: 63 IN | SYSTOLIC BLOOD PRESSURE: 124 MMHG | BODY MASS INDEX: 35.78 KG/M2

## 2025-01-14 DIAGNOSIS — M54.50 ACUTE LEFT-SIDED LOW BACK PAIN, UNSPECIFIED WHETHER SCIATICA PRESENT: Primary | ICD-10-CM

## 2025-01-14 DIAGNOSIS — E03.9 ACQUIRED HYPOTHYROIDISM: ICD-10-CM

## 2025-01-14 DIAGNOSIS — K59.09 CHRONIC CONSTIPATION: ICD-10-CM

## 2025-01-14 DIAGNOSIS — R10.32 LEFT LOWER QUADRANT ABDOMINAL PAIN: ICD-10-CM

## 2025-01-14 DIAGNOSIS — I10 BENIGN DIASTOLIC HYPERTENSION: ICD-10-CM

## 2025-01-14 DIAGNOSIS — J45.20 MILD INTERMITTENT ASTHMA WITHOUT COMPLICATION (HHS-HCC): ICD-10-CM

## 2025-01-14 DIAGNOSIS — F41.9 ANXIETY: ICD-10-CM

## 2025-01-14 DIAGNOSIS — R82.90 CLOUDY URINE: ICD-10-CM

## 2025-01-14 DIAGNOSIS — M54.50 ACUTE LEFT-SIDED LOW BACK PAIN, UNSPECIFIED WHETHER SCIATICA PRESENT: ICD-10-CM

## 2025-01-14 LAB
APPEARANCE UR: CLEAR
BILIRUB UR STRIP.AUTO-MCNC: NEGATIVE MG/DL
COLOR UR: YELLOW
GLUCOSE UR STRIP.AUTO-MCNC: NORMAL MG/DL
KETONES UR STRIP.AUTO-MCNC: NEGATIVE MG/DL
LEUKOCYTE ESTERASE UR QL STRIP.AUTO: ABNORMAL
MUCOUS THREADS #/AREA URNS AUTO: ABNORMAL /LPF
NITRITE UR QL STRIP.AUTO: NEGATIVE
PH UR STRIP.AUTO: 5.5 [PH]
PROT UR STRIP.AUTO-MCNC: NEGATIVE MG/DL
RBC # UR STRIP.AUTO: NEGATIVE /UL
RBC #/AREA URNS AUTO: ABNORMAL /HPF
SP GR UR STRIP.AUTO: 1.03
SQUAMOUS #/AREA URNS AUTO: ABNORMAL /HPF
UROBILINOGEN UR STRIP.AUTO-MCNC: NORMAL MG/DL
WBC #/AREA URNS AUTO: ABNORMAL /HPF

## 2025-01-14 PROCEDURE — A9698 NON-RAD CONTRAST MATERIALNOC: HCPCS | Performed by: INTERNAL MEDICINE

## 2025-01-14 PROCEDURE — 2550000001 HC RX 255 CONTRASTS: Performed by: INTERNAL MEDICINE

## 2025-01-14 PROCEDURE — 3074F SYST BP LT 130 MM HG: CPT | Performed by: INTERNAL MEDICINE

## 2025-01-14 PROCEDURE — 87086 URINE CULTURE/COLONY COUNT: CPT

## 2025-01-14 PROCEDURE — 1036F TOBACCO NON-USER: CPT | Performed by: INTERNAL MEDICINE

## 2025-01-14 PROCEDURE — 99214 OFFICE O/P EST MOD 30 MIN: CPT | Performed by: INTERNAL MEDICINE

## 2025-01-14 PROCEDURE — 81001 URINALYSIS AUTO W/SCOPE: CPT

## 2025-01-14 PROCEDURE — 74176 CT ABD & PELVIS W/O CONTRAST: CPT | Performed by: RADIOLOGY

## 2025-01-14 PROCEDURE — 3078F DIAST BP <80 MM HG: CPT | Performed by: INTERNAL MEDICINE

## 2025-01-14 PROCEDURE — 74176 CT ABD & PELVIS W/O CONTRAST: CPT

## 2025-01-14 RX ORDER — DOCUSATE SODIUM 100 MG/1
100 CAPSULE, LIQUID FILLED ORAL 2 TIMES DAILY
COMMUNITY

## 2025-01-14 RX ADMIN — IOHEXOL 500 ML: 12 SOLUTION ORAL at 12:57

## 2025-01-14 NOTE — PROGRESS NOTES
"Subjective   Patient ID: Thuy Elizondo is a 59 y.o. female who presents for Abdominal Pain (Lower abd and back).    HPI   Brinda is a 59-year-old  female who comes today for an acute visit.  She complains of left lower quadrant abdominal pain for the past 2 weeks and she has also been experiencing some pain in the left side of her low back.  Patient has history of chronic constipation since she had her cholecystomy.  She denies fever, chills, chest pain, shortness of breath, cough, dizziness, palpitations, syncope, nausea, vomiting, diarrhea, melena, rectal bleeding.  No dysuria or hematuria but patient has noted cloudy urine at times.  Medications were reviewed and updated in the medical record, patient is compliant with them and reports no significant side effects.  Review of Systems  As per HPI.  Objective   /72 (BP Location: Right arm, Patient Position: Sitting, BP Cuff Size: Large adult)   Ht 1.6 m (5' 3\")   LMP 08/01/2022   BMI 35.78 kg/m²     Physical Exam  General - Well developed, well appearing, obese middle-aged  female in no acute respiratory distress  Eyes - normal conjunctiva with no pallor or icterus, normal extraocular movements  Neck - No JVD, thyromegaly or lymphadenopathy  Lungs - no respiratory distress and lungs clear to auscultation bilaterally with no rales or wheezes  Heart - normal S1, S2 with normal heart rate, rhythm and no murmurs   Abdomen -  soft, left lower quadrant tenderness noted, bowel sounds present  Extremities - no cyanosis or pedal edema  Neuro - grossly normal neuro exam with no focal neuro deficits  Psych - normal mental status, mood and affect   Assessment/Plan     1.  Left lower quadrant abdominal pain associated with constipation for 2 weeks-patient does admit to eating a lot of nuts, CT abdomen/pelvis stat will be ordered to rule out diverticulitis  2.  Left-sided low back pain associated with cloudy urine-urinalysis and urine culture will be " ordered  3.  Asthma-currently stable  4.  Hypothyroidism-patient will continue current dose of levothyroxine  5.  Hypertension-BP is controlled and patient will continue current antihypertensive therapy  6.  Anxiety-stable and patient will continue current dose of venlafaxine  Follow-up in May 2025 or sooner if needed.  30 minutes spent rooming the patient, reviewing records, eliciting history, examining patient, counseling, coordination of care and in documentation.

## 2025-01-14 NOTE — TELEPHONE ENCOUNTER
Patient called and she is having abdominal and pelvic pain.  No fever,chills   Does not feel like she has a uti  some constipation.  Pain level is about a 4.  She wanted to know if she could see you in the next couple days    [phone]

## 2025-01-15 ENCOUNTER — TELEPHONE (OUTPATIENT)
Dept: PRIMARY CARE | Facility: CLINIC | Age: 60
End: 2025-01-15
Payer: MEDICAID

## 2025-01-15 LAB — BACTERIA UR CULT: NORMAL

## 2025-01-15 NOTE — TELEPHONE ENCOUNTER
Call patient-urine culture is not back yet and I am waiting for it to determine the appropriate antibiotic.

## 2025-01-16 DIAGNOSIS — R82.81 PYURIA: Primary | ICD-10-CM

## 2025-01-16 RX ORDER — NITROFURANTOIN 25; 75 MG/1; MG/1
100 CAPSULE ORAL 2 TIMES DAILY
Qty: 14 CAPSULE | Refills: 0 | Status: SHIPPED | OUTPATIENT
Start: 2025-01-16 | End: 2025-01-23

## 2025-02-03 ENCOUNTER — APPOINTMENT (OUTPATIENT)
Dept: RADIOLOGY | Facility: CLINIC | Age: 60
End: 2025-02-03
Payer: MEDICAID

## 2025-02-05 ENCOUNTER — APPOINTMENT (OUTPATIENT)
Dept: PRIMARY CARE | Facility: CLINIC | Age: 60
End: 2025-02-05
Payer: MEDICAID

## 2025-02-14 ENCOUNTER — APPOINTMENT (OUTPATIENT)
Dept: RADIOLOGY | Facility: CLINIC | Age: 60
End: 2025-02-14
Payer: MEDICAID

## 2025-02-18 DIAGNOSIS — M54.16 LUMBAR RADICULOPATHY: ICD-10-CM

## 2025-02-21 ENCOUNTER — APPOINTMENT (OUTPATIENT)
Dept: ORTHOPEDIC SURGERY | Facility: CLINIC | Age: 60
End: 2025-02-21
Payer: MEDICAID

## 2025-02-21 ENCOUNTER — HOSPITAL ENCOUNTER (OUTPATIENT)
Dept: RADIOLOGY | Facility: CLINIC | Age: 60
End: 2025-02-21
Payer: MEDICAID

## 2025-02-28 ENCOUNTER — OFFICE VISIT (OUTPATIENT)
Dept: ORTHOPEDIC SURGERY | Facility: CLINIC | Age: 60
End: 2025-02-28
Payer: MEDICAID

## 2025-02-28 ENCOUNTER — HOSPITAL ENCOUNTER (OUTPATIENT)
Dept: RADIOLOGY | Facility: CLINIC | Age: 60
Discharge: HOME | End: 2025-02-28
Payer: MEDICAID

## 2025-02-28 DIAGNOSIS — M54.42 CHRONIC LEFT-SIDED LOW BACK PAIN WITH LEFT-SIDED SCIATICA: Primary | ICD-10-CM

## 2025-02-28 DIAGNOSIS — M54.16 LUMBAR RADICULOPATHY: ICD-10-CM

## 2025-02-28 DIAGNOSIS — G89.29 CHRONIC LEFT-SIDED LOW BACK PAIN WITH LEFT-SIDED SCIATICA: Primary | ICD-10-CM

## 2025-02-28 PROCEDURE — 99214 OFFICE O/P EST MOD 30 MIN: CPT | Performed by: STUDENT IN AN ORGANIZED HEALTH CARE EDUCATION/TRAINING PROGRAM

## 2025-02-28 PROCEDURE — 72100 X-RAY EXAM L-S SPINE 2/3 VWS: CPT

## 2025-02-28 PROCEDURE — 1036F TOBACCO NON-USER: CPT | Performed by: STUDENT IN AN ORGANIZED HEALTH CARE EDUCATION/TRAINING PROGRAM

## 2025-02-28 RX ORDER — MELOXICAM 15 MG/1
15 TABLET ORAL DAILY
Qty: 30 TABLET | Refills: 0 | Status: SHIPPED | OUTPATIENT
Start: 2025-02-28 | End: 2025-03-30

## 2025-02-28 NOTE — PROGRESS NOTES
Orthopaedic Spine Surgery Clinic Note    Patient ID: Thuy Elizondo is a 60 y.o. female.    Chief Complaint  Chief Complaint   Patient presents with    Lower Back - Pain       History of Present Illness  Ms. Elizondo is a 60-year-old female who presents for initial evaluation of chronic low back and primarily left lower extremity pain.  Patient states her symptoms have been longstanding for several years.  She states that they have been worsening more recently.  She relates pain in her low back that proceeds down her left buttock into her left leg.  It primarily terminates at the level of the knee, involving the left anterior thigh primarily.  She also endorses some numbness and paresthesias over the left anterior thigh.  She states prolonged standing seems to exacerbate her pains.  She does obtain some rest relief when sitting, although she does state that it is harder to sleep flat on her back.  She denies bowel or bladder control issues, although states that it is harder to make it to the bathroom at times.    No formal physical therapy.    Prior treatments:  She has tried as needed ibuprofen and Tylenol    Relevant PMH/PSH for spine    Past Medical History:   Diagnosis Date    Abnormal uterine bleeding 2015    Depression, unspecified 2014    Depression    Hypothyroidism, unspecified 2014    Hypothyroidism    Personal history of diseases of the blood and blood-forming organs and certain disorders involving the immune mechanism 2014    History of anemia       Past Surgical History:   Procedure Laterality Date    CARPAL TUNNEL RELEASE  2014    Neuroplasty Decompression Median Nerve At Carpal Tunnel     SECTION, CLASSIC  2014     Section    TONSILLECTOMY  2014    Tonsillectomy       Social History     Socioeconomic History    Marital status:    Tobacco Use    Smoking status: Former     Current packs/day: 0.00     Types: Cigarettes     Quit date:       Years since quittin.1    Smokeless tobacco: Never   Substance and Sexual Activity    Alcohol use: Yes    Drug use: Never     Social Drivers of Health     Financial Resource Strain: Low Risk  (2024)    Overall Financial Resource Strain (CARDIA)     Difficulty of Paying Living Expenses: Not hard at all   Transportation Needs: No Transportation Needs (2024)    PRAPARE - Transportation     Lack of Transportation (Medical): No     Lack of Transportation (Non-Medical): No   Housing Stability: Low Risk  (2024)    Housing Stability Vital Sign     Unable to Pay for Housing in the Last Year: No     Number of Times Moved in the Last Year: 1     Homeless in the Last Year: No       Family History   Problem Relation Name Age of Onset    Hypertension Mother      Diabetes Father         Allergies   Allergen Reactions    Animal Dander Unknown    Egg Unknown       Current Outpatient Medications   Medication Instructions    albuterol 90 mcg/actuation aerosol [facility] breath activated inhaler 1 puff, inhalation, Every 4 hours PRN    chlorthalidone (HYGROTON) 25 mg, oral, Daily    docusate sodium (COLACE) 100 mg, 2 times daily    ferrous sulfate, dried (Slow Release Iron) 160 mg (50 mg iron) ER tablet 1 tablet, Daily    levothyroxine (SYNTHROID, LEVOXYL) 100 mcg, oral, Daily    meloxicam (MOBIC) 15 mg, oral, Daily    metoprolol succinate XL (TOPROL-XL) 100 mg, oral, Daily    psyllium (Metamucil) 3.4 gram packet 1 packet, Daily    venlafaxine XR (EFFEXOR-XR) 150 mg, oral, Nightly         Vitals & Measurements  There were no vitals filed for this visit.     Ortho Exam  General: AO x 3, NAD  Cardio: examined extremities perfused by peripheral palpation  Resp: breathing unlabored  Gait: within normal limits, non-antalgic  Tenderness: Positive tenderness to palpation over the lower lumbar spine, midline as well as paraspinal region    Lower Extremity  Right  Left  IP   5/5  5/5  Quadriceps  5/5  5/5  Tibialis  anterior  5/5  5/5  EHL   5/5  5/5  Gastrocnemius  5/5  5/5    Sensation: Normal to light touch throughout lower extremities bilaterally.    Reflexes:  Right   Left  Q  1+  1+  A   2+   2+    Clonus: negative      Diagnostic Results - Imaging    XR lumbar spine today, 2/28/2025  New upright AP and lateral radiographs of the lumbar spine were obtained in the office today.  These images are of good quality.  Demonstrate on sagittal profile is a hypolordotic lumbar spine.  There is disc degeneration with complete collapse at C6 L3-4 with moderate disc degeneration at L2-3 and L5-S1.  There is a grade 1 retrolisthesis at L3-4.  There is multilevel facet arthropathy throughout the lumbar spine.      Diagnosis  Encounter Diagnoses   Name Primary?    Lumbar radiculopathy     Chronic left-sided low back pain with left-sided sciatica Yes        Assessment/Plan  Ms. Elizondo is a 60-year-old female who presents for initial evaluation of chronic low back and primarily left lower extremity pain with numbness and paresthesias.  Patient's symptoms have been longstanding for several years.  Her XR evaluation today in the office demonstrates multilevel spondylosis with disc degeneration with almost complete collapse at L3-4 with a grade 1 retrolisthesis.    I had an extensive discussion with the patient in the office today with regards to her symptoms, her imaging findings, and possible management options.  We discussed how her spondylotic changes in the lumbar spine are likely pain generators for her.  She likely also has some element of nerve stenosis/radiculopathy given her anterior thigh burning with numbness and paresthesias.  We discussed proceeding with nonsurgical management preliminarily in the form of a referral to physical therapy for a low back and lower extremity stretching and strengthening program.  I also prescribed meloxicam 15 mg/day to be taken on an as-needed basis for her pain.  We discussed not taking this with  other NSAIDs and discussed side effects/intolerances including gastritis/ulcers and kidney problems.    Patient will follow-up with me via telephone call in approximately 4 to 6 weeks after completing her physical therapy program.  I provided my direct office contact line for her to give us a call with an update.  If her symptoms fail to improve or worsen, we can consider an MRI of the lumbar spine to evaluate more specifically for any focus of nerve stenosis that is contributing to her symptoms. After our discussion, the patient articulated understanding of the plan and felt that all questions had been answered satisfactorily. The patient was pleased with the visit and very appreciative for the care rendered.    **Please excuse any errors in grammar or translation related to this dictation. Voice recognition software was utilized to prepare this document. **    F/u PRN.       Orders Placed This Encounter    Referral to Physical Therapy    meloxicam (Mobic) 15 mg tablet       --    Osorio Gabriel MD  Orthopaedic Spine Surgery  , Department of Orthopaedic Surgery  [facility]

## 2025-03-10 NOTE — PROGRESS NOTES
Physical Therapy  Physical Therapy Orthopedic Evaluation    Patient Name: Thuy Elizondo  MRN: [id number]  Today's Date: 3/12/2025  Time Calculation  Start Time: 1035  Stop Time: 1120  Time Calculation (min): 45 min    Insurance:  Visit number: 1 of 30  Authorization info: Auth needed  Insurance Type: [employer]  Cert dates: 3/12/25 to 6/140/25    General:  Reason for visit: Chronic LB pain w/ sciatica (L)  Referred by: Osorio Gabriel     Current Problem:  1. Low back pain  Follow Up In Physical Therapy      2. Numbness and tingling of right leg        3. Decreased range of motion of lumbar spine        4. Lower extremity weakness            Precautions: Precautions  STEADI Fall Risk Score (The score of 4 or more indicates an increased risk of falling): 2  Precautions Comment: None      Medical History Form: Reviewed (scanned into chart)    Subjective:   60 y.o. female presents to the clinic with complaints of LB pain w/ radiating numbness down her L buttock & into her A thigh, terminates proximal to the knee.  Interferes with her ability to stand at work w/o exacerbations after ~ 1 hour.  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  Goes home with increased painin A thigh w/ cramping pain in her calf.  Takes prescribed medications every morning, in which she feels helps significantly. Also notes tightness in her L anterior thigh that causes her discomfort. Tends to lean forward on counters or other objects to relieve tightness associated pain.   Patient is also limited in her ability to lift heavy objects out of fear of exacerbating symptoms.         Chief Complaint: Patient presents to clinic LB w/ radiating pain   DARRELL: Chronic    Current Condition:   Same    Pain: 4/10  worst; 8/10   Pain Assessment: 0-10  0-10 (Numeric) Pain Score: 4  Pain Type: Chronic pain  Pain Location: Leg  Pain Orientation: Anterior  Pain Descriptors: Numbness, Tingling  Pain Frequency: Constant/continuous  Location: R  anterior thigh   Description: shooting, numbness   Aggravating Factors: Standing after prolonged sitting, prolonged standing/walking  Relieving Factors:  None     Relevant Information (PMH & Previous Tests/Imaging): x-ray    -Moderate multilevel spondylosis worse at L3-L4. Mild retrolisthesis  at this level.    Previous Interventions/Treatments: None    Prior Level of Function (PLOF)  Patient previously independent with all ADLs  Exercise/Physical Activity: None   Work/School: Pharmacy tech     Patients Living Environment: Reviewed and no concern    Primary Language: English    There are no spiritual/cultural practices/values/needs that are important to know    Patient's Goal(s) for Therapy: Manage pain and pain prevention     Red Flags: Do you have any of the following? No  Fever/chills, unexplained weight changes, dizziness/fainting, unexplained change in bowel or bladder functions, unexplained malaise or muscle weakness, night pain/sweats, numbness or tingling    Objective:  Objective       ROM    Lumbar AROM (%)    Flexion: mod limitations   Extension: severe limitations   (L) Side Bend: severe limitations   (R) Side Bend: severe limitations   (L) Rotation: mod limitations   (R) Rotation: mod limitations       Hip AROM (Degrees)      (R)  (L)  Flexion:      Extension:       Abduction:        Adduction:       ER:         IR:        Appears WNL; will be assessed more in depth at a later date            Strength Testing    Core/Abdominals: 3    Hip    (R)  (L)  Flexion: 4  3     Extension: 3  3    Abduction: 5  5    Adduction: 5  5    ER:  5  5    IR:  5  5      Dermatomes:  WNL grossly     Posture: forward flexed       Palpation: mild tenderness to palpation along L3-L5      Flexibility: limited grossly especially in lumbar extension and side bending    Mobility: limited L spinous process mobility w/ mobs, mild increase in pain.           Functional Screening    Squat: will be assessed at a later  date          Special Tests      Slump Test: -  Nerve tension test (femoral/sciatic): -        Outcome Measures:  Other Measures  Lower Extremity Funtional Score (LEFS): 30       EDUCATION: Home exercise program, plan of care, activity modifications, pain management, and injury pathology       Goals: Set and discussed today  Active       Balance       Goal 1       Start:  03/12/25    Expected End:  04/23/25       Short Term Goals  1) Patient will be independent with HEP within 1 week demonstrating independence in exercise prescription.   2) Patient will report no more than 3/10 pain after standing at work for ~1 hour demonstrating improvements in symptom management within 5 weeks.   3) Patient will demonstrate 5/5 MMT grades demonstrating improvements in strength within 6 weeks.   4) Patient will demonstrate proper squatting/lifting strategies w/ TrA activation demonstrating safe lifting for work/home activities within 6 weeks.   5) Patient will report no more than a 40 on the ANASTASIYA demonstrating improvements in ADL participation and function within 6 weeks.   6) Patient will improve lumbar range of motion to mild limitations grossly for improvements in mobility and decrease compensatory movements within 6 weeks.          Goal 2       Start:  03/12/25    Expected End:  06/04/25       Long Term Goals  1) Patient will report no more than a 62 on the ANASTASIYA demonstrating improvements in ADL participation and function within 12 weeks.   2) Patient will report no more than 1/10 pain while standing at work for ~6 hours demonstrating improvements in symptom management within 12 weeks.   3) Patient will improve her lumbar range of motion to WNL demonstrating improvements in mobility and decreased compensatory movements within 12 weeks.                 Plan of care was developed with input and agreement by the patient      Treatment Performed:      Therapeutic Exercise:    25 min  Lumbar extensions  Prone press up   Knee to chest  cross body stretch   Education on exercise frequency & S/S monitoring     Units:  Pauline low: 1  TE:2    PT Evaluation Time Entry  PT Evaluation (Low) Time Entry: 20  PT Therapeutic Procedures Time Entry  Therapeutic Exercise Time Entry: 25                      Assessment:   Patient presents with sign & symptoms consistent with chronic LB pain, resulting in limited participation in their ADLs pain free & an inability to perform at their PLOF.  Activities most affected by their current deficits include her ability to stand/walk for prolonged periods of time & lift heavy objects.  Throughout today's session her symptoms were only mildly provoked during joint mobilizations at her L3/L4 spine.  Patient was given a HEP to assess how she responded to lumbar extension, while giving her basic stretching for her limited mobility.  Time was additionally spent educating her on the difference between the tightness she feels and the numbness/tingling.  As well as S/S to look for & why interventions were chosen for each deficit.  Today's findings found deficits primarily in Lumbar mobility.  Future sessions will focus on education on pain management strategies, stretching, & strengthening of her core/LE.  The skills of a physical therapist are required to address deficits found & to progress them to a return to their PLOF pain free.  Patient demonstrates good rehab potential due to her mild symptom presentation during evaluation.  With negative indicators including the chronicity of her pain.          Clinical Presentation: Stable and/or uncomplicated characteristics    Plan:     Planned Interventions include: therapeutic exercise, self-care home management, manual therapy, therapeutic activities, gait training, neuromuscular coordination, vasopneumatic, dry needling, aquatic therapy  Frequency: 1 x Week  Duration: 12 Weeks  Rehab Potential/Prognosis: Jean Carlos Chavez, PT

## 2025-03-12 ENCOUNTER — EVALUATION (OUTPATIENT)
Dept: PHYSICAL THERAPY | Facility: CLINIC | Age: 60
End: 2025-03-12
Payer: MEDICAID

## 2025-03-12 DIAGNOSIS — R29.898 LOWER EXTREMITY WEAKNESS: ICD-10-CM

## 2025-03-12 DIAGNOSIS — G89.29 CHRONIC LEFT-SIDED LOW BACK PAIN, UNSPECIFIED WHETHER SCIATICA PRESENT: ICD-10-CM

## 2025-03-12 DIAGNOSIS — M54.50 CHRONIC LEFT-SIDED LOW BACK PAIN, UNSPECIFIED WHETHER SCIATICA PRESENT: ICD-10-CM

## 2025-03-12 DIAGNOSIS — R20.2 NUMBNESS AND TINGLING OF RIGHT LEG: ICD-10-CM

## 2025-03-12 DIAGNOSIS — R20.0 NUMBNESS AND TINGLING OF RIGHT LEG: ICD-10-CM

## 2025-03-12 DIAGNOSIS — M54.50 LOW BACK PAIN: Primary | ICD-10-CM

## 2025-03-12 DIAGNOSIS — M53.86 DECREASED RANGE OF MOTION OF LUMBAR SPINE: ICD-10-CM

## 2025-03-12 PROCEDURE — 97161 PT EVAL LOW COMPLEX 20 MIN: CPT | Mod: GP

## 2025-03-12 PROCEDURE — 97110 THERAPEUTIC EXERCISES: CPT | Mod: GP

## 2025-03-12 ASSESSMENT — PAIN DESCRIPTION - DESCRIPTORS: DESCRIPTORS: NUMBNESS;TINGLING

## 2025-03-12 ASSESSMENT — PAIN - FUNCTIONAL ASSESSMENT: PAIN_FUNCTIONAL_ASSESSMENT: 0-10

## 2025-03-12 ASSESSMENT — PAIN SCALES - GENERAL: PAINLEVEL_OUTOF10: 4

## 2025-03-19 ENCOUNTER — TREATMENT (OUTPATIENT)
Dept: PHYSICAL THERAPY | Facility: CLINIC | Age: 60
End: 2025-03-19
Payer: MEDICAID

## 2025-03-19 DIAGNOSIS — R29.898 LOWER EXTREMITY WEAKNESS: Primary | ICD-10-CM

## 2025-03-19 PROCEDURE — 97110 THERAPEUTIC EXERCISES: CPT | Mod: GP,CQ | Performed by: SPECIALIST/TECHNOLOGIST

## 2025-03-19 PROCEDURE — 97112 NEUROMUSCULAR REEDUCATION: CPT | Mod: GP,CQ | Performed by: SPECIALIST/TECHNOLOGIST

## 2025-03-19 ASSESSMENT — PAIN - FUNCTIONAL ASSESSMENT: PAIN_FUNCTIONAL_ASSESSMENT: 0-10

## 2025-03-19 NOTE — PROGRESS NOTES
"Physical Therapy  Physical Therapy Treatment    Patient Name: Thuy Elizondo  MRN: [id number]  Today's Date: 3/19/2025  Time Calculation  Start Time: 1545  Stop Time: 1630  Time Calculation (min): 45 min    Insurance:  Visit number: 2 of 30  Authorization info: no auth needed  Insurance Type: [employer]               General  Reason for Referral: LBP  Referred By: LEXA Gabriel MD    Current Problem  1. Lower extremity weakness            Precautions  STEADI Fall Risk Score (The score of 4 or more indicates an increased risk of falling): 2  Precautions Comment: None    Pain Assessment: 0-10    Subjective:   Patient reports feeling ok on arrival although noted leg soreness the morning after initial evaluation.  HEP Performed:  Yes    Objective:   No innominate  Pirif 5°  Hip ext 1/2\"   MMT hip ext 3+ hip abd 3+        Treatments:   Stepper L2 5 min   Valslide HIR/HER R/L/B 10x  Green TB inchworm in //bars 2 laps  Hip ext R/L 44#/44# 20x  4 kg KB iso SB R/L 1'   Bravo 5# iso Rot R/L 1'   Hams 20# 20x  SS hams R/L 1'   SS R/L HF 1'   PB LTR 20x, hams 20x, bridge 10x  SS Pirif R/L 1', R/L HF 1'  *  Standing lumbar ext 1' *     * Add to HEP at work if leg burning to decrease burning    Charges: TE x2, NME     Assessment:   Patient tolerated intensity with mild fatigue noting feeling ok post treatment without any increased symptoms.     Plan: Continue to improve ROM, core stability and strength, anterior flexibility and balance to improve gait and ADL      Luis Schaefer, PTA  "

## 2025-03-21 ENCOUNTER — APPOINTMENT (OUTPATIENT)
Dept: PHYSICAL THERAPY | Facility: CLINIC | Age: 60
End: 2025-03-21
Payer: MEDICAID

## 2025-03-21 DIAGNOSIS — M53.86 DECREASED RANGE OF MOTION OF LUMBAR SPINE: ICD-10-CM

## 2025-03-21 DIAGNOSIS — R20.0 NUMBNESS AND TINGLING OF RIGHT LEG: ICD-10-CM

## 2025-03-21 DIAGNOSIS — M54.50 LOW BACK PAIN: Primary | ICD-10-CM

## 2025-03-21 DIAGNOSIS — G89.29 CHRONIC LEFT-SIDED LOW BACK PAIN, UNSPECIFIED WHETHER SCIATICA PRESENT: ICD-10-CM

## 2025-03-21 DIAGNOSIS — M54.50 CHRONIC LEFT-SIDED LOW BACK PAIN, UNSPECIFIED WHETHER SCIATICA PRESENT: ICD-10-CM

## 2025-03-21 DIAGNOSIS — R29.898 LOWER EXTREMITY WEAKNESS: ICD-10-CM

## 2025-03-21 DIAGNOSIS — R20.2 NUMBNESS AND TINGLING OF RIGHT LEG: ICD-10-CM

## 2025-03-26 ENCOUNTER — TREATMENT (OUTPATIENT)
Dept: PHYSICAL THERAPY | Facility: CLINIC | Age: 60
End: 2025-03-26
Payer: MEDICAID

## 2025-03-26 DIAGNOSIS — M54.50 LOW BACK PAIN: ICD-10-CM

## 2025-03-26 PROCEDURE — 97110 THERAPEUTIC EXERCISES: CPT | Mod: GP

## 2025-03-26 NOTE — PROGRESS NOTES
Physical Therapy  Physical Therapy Treatment Note    Patient Name: Thuy Elizondo  MRN: [id number]  Today's Date: 3/26/2025  Time Calculation  Start Time: 1003  Stop Time: 1043  Time Calculation (min): 40 min    Insurance:  Visit number: 3 of 30  Authorization info: no auth   Insurance Type: [employer]     General:  Reason for visit: Chronic LB pain w/ sciatica (L)  Referred by: Osorio Gabriel     Current Problem  1. Low back pain  Follow Up In Physical Therapy          Precautions: None       Subjective:     Patient reports her radicular pain comes and goes, isn't bothering her today, some days it does some it doesn't.  A little sore after last weeks session.  Performing HEP but experiences incresed soreness when she performs them in the morning.  Continues to note stiffness after bouts of sitting between activity, such as during her lunch break at work.     Pain   0/10    Performing HEP?: Yes      Objective:   Lumbar AROM (%)     Flexion: mod limitations   Extension: severe limitations   (L) Side Bend: severe limitations   (R) Side Bend: severe limitations   (L) Rotation: mod limitations   (R) Rotation: mod limitations          Treatment Performed:      Therapeutic Exercise:    40 min  [place] carry 14Kg, 16KG  Bravo iso lateral walk 10#  Bravo reverse walk #29  Bird dog  Cat cow  Bridge 3 KG  Modified side plank    Manual Therapy:     min      Neuromuscular Re-education:   min      Other:      min      Current HEP:   Lumbar extensions  Prone press up   Knee to chest cross body stretch   Added to HEP  Access Code: [email or web address]  URL: [email or web address]  Date: 03/26/2025  Prepared by: Gin Chavez    Exercises  - Supine Bridge  - 1 x daily - 7 x weekly - 2 sets - 15 reps  - Bird Dog  - 1 x daily - 7 x weekly - 2 sets - 10 reps  - Side Plank on Knees  - 1 x daily - 7 x weekly - 2 sets - 20 sec hold  - Cat Cow  - 1 x daily - 7 x weekly - 2 sets - 15 reps     PT Therapeutic Procedures Time  Entry  Therapeutic Exercise Time Entry: 40   Units:  TE:3                   Assessment:   Thuy Elizondo continues to progress well in physical therapy.  Her radicular symptoms have improved as well as her back pain.  Today's session was progressed by incorporating more dynamic stabilization exercises, while progressing her HEP program accordingly.  Thuy tolerated today's session well.  Future sessions will continue to progress her LE, core, & LB strength.      Plan:  LE strengthening, LB/core stability dynamic       Gin Chavez, PT

## 2025-04-04 ENCOUNTER — TREATMENT (OUTPATIENT)
Dept: PHYSICAL THERAPY | Facility: CLINIC | Age: 60
End: 2025-04-04
Payer: MEDICAID

## 2025-04-04 DIAGNOSIS — R20.2 NUMBNESS AND TINGLING OF RIGHT LEG: ICD-10-CM

## 2025-04-04 DIAGNOSIS — M54.50 LOW BACK PAIN: Primary | ICD-10-CM

## 2025-04-04 DIAGNOSIS — M53.86 DECREASED RANGE OF MOTION OF LUMBAR SPINE: ICD-10-CM

## 2025-04-04 DIAGNOSIS — R20.0 NUMBNESS AND TINGLING OF RIGHT LEG: ICD-10-CM

## 2025-04-04 DIAGNOSIS — R29.898 WEAKNESS OF BOTH LOWER EXTREMITIES: ICD-10-CM

## 2025-04-04 PROCEDURE — 97110 THERAPEUTIC EXERCISES: CPT | Mod: GP

## 2025-04-04 NOTE — PROGRESS NOTES
Physical Therapy  Physical Therapy Treatment Note    Patient Name: Thuy Elizondo  MRN: [id number]  Today's Date: 4/4/2025  Time Calculation  Start Time: 1446  Stop Time: 1530  Time Calculation (min): 44 min    Insurance:  Visit number: 4 of 30  Authorization info: no auth   Insurance Type: [employer]     General:  Reason for visit: Chronic LB pain w/ sciatica (L)  Referred by: Osorio Gabriel     Current Problem  1. Low back pain  Follow Up In Physical Therapy      2. Weakness of both lower extremities        3. Numbness and tingling of right leg        4. Decreased range of motion of lumbar spine              Precautions: None       Subjective:     Patient reports she feels her back is feeling better overall than it did last week.  Feels she can stand now ~1 hour before she feels pain gets worse. Notes discomfort in her R upper thigh area within the last week, not doing HEP due to pain.  Described more as soreness vs. True pain.    Pain   3/10    Performing HEP?: Yes      Objective:   Lumbar AROM (%)     Flexion: mod limitations   Extension: severe limitations   (L) Side Bend: severe limitations   (R) Side Bend: severe limitations   (L) Rotation: mod limitations   (R) Rotation: mod limitations          Treatment Performed:      Therapeutic Exercise:    44 min  Hip ADD/ABD machine 62.5# 2x12  Lateral walks - discontinued due to pain  [place] twists 10#  Machine hip extensions #33 2x12    Not today  [place] carry 14Kg, 16KG  Bravo iso lateral walk 10#  Bravo reverse walk #29  Bird dog  Bridge 3 KG  Modified plank   Modified side plank   Cat cow   Green band firehydrants   Iso squat w/ row     Manual Therapy:     min      Neuromuscular Re-education:   min      Other:      min      Current HEP:   Lumbar extensions  Prone press up   Knee to chest cross body stretch   Added to HEP  Access Code: [email or web address]  URL: [email or web address]  Date: 03/26/2025  Prepared by: Gin Chavez    Exercises  - Supine  Bridge  - 1 x daily - 7 x weekly - 2 sets - 15 reps  - Bird Dog  - 1 x daily - 7 x weekly - 2 sets - 10 reps  - Side Plank on Knees  - 1 x daily - 7 x weekly - 2 sets - 20 sec hold  - Cat Cow  - 1 x daily - 7 x weekly - 2 sets - 15 reps  Access Code: [email or web address]  URL: [email or web address]  Date: 04/04/2025  Prepared by: Gin Chavez    Exercises  - Sit to Stand  - 1 x daily - 7 x weekly - 3 sets - 10 reps  - Clamshell with Resistance  - 1 x daily - 7 x weekly - 3 sets - 10 reps  - Standing March with Counter Support  - 1 x daily - 7 x weekly - 3 sets - 20 reps     PT Therapeutic Procedures Time Entry  Therapeutic Exercise Time Entry: 44   Units:  TE:3                   Assessment:   Thuy Elizondo is progressing well in physical therapy.  Patient demonstrated improvements in LE strength & symptom presentation between sessions.  Today's session focused on progressing her LE strength, they tolerated today's session well.  Demonstrating improvements in glute and quad strength, most notably seen in her sit to stands.  However, patient continues to demonstrate deficits in LE strength & ROM.  Patient reported sharp pain in her R hip.  Activity was modified to work on progressing strength without aggravating her symptoms. The skills of a physical therapist are required to continue to progress their deficits in strength, ROM, & symptom management, returning them to a full participation in all their ADL w/o pain/limitations.  Patient was provided an updated HEP including quad and glute strengthening.         Plan:  LE strengthening, LB/core stability dynamic       Gin Chavez, PT

## 2025-04-09 ENCOUNTER — TREATMENT (OUTPATIENT)
Dept: PHYSICAL THERAPY | Facility: CLINIC | Age: 60
End: 2025-04-09
Payer: MEDICAID

## 2025-04-09 DIAGNOSIS — M54.50 LOW BACK PAIN: ICD-10-CM

## 2025-04-09 PROCEDURE — 97110 THERAPEUTIC EXERCISES: CPT | Mod: GP,CQ | Performed by: SPECIALIST/TECHNOLOGIST

## 2025-04-09 ASSESSMENT — PAIN SCALES - GENERAL: PAINLEVEL_OUTOF10: 4

## 2025-04-09 ASSESSMENT — PAIN - FUNCTIONAL ASSESSMENT: PAIN_FUNCTIONAL_ASSESSMENT: 0-10

## 2025-04-09 NOTE — PROGRESS NOTES
"  Physical Therapy  Physical Therapy Treatment Note    Patient Name: Thuy Elizondo  MRN: [id number]  Today's Date: 4/9/2025  Time Calculation  Start Time: 1115  Stop Time: 1200  Time Calculation (min): 45 min    Insurance:  Visit number: 5 of 30  Authorization info: no auth   Insurance Type: [employer]     General:  Reason for visit: Chronic LB pain w/ sciatica (L)  Referred by: Osorio Gabriel     Current Problem  1. Low back pain  Follow Up In Physical Therapy            Precautions: None       Subjective:     Patient reports having L thigh soreness \"like a cramp\".  Patient notes thigh pain 4 of 10 on arrival.  Patient reports mild L abdominal/groin pain yesterday.  Patient has MD follow-up in about a month.      Pain  Pain Assessment: 0-10  0-10 (Numeric) Pain Score: 4    Performing HEP?: Yes    Objective:   Lumbar AROM (%)     Flexion: mod limitations   Extension: severe limitations   (L) Side Bend: severe limitations   (R) Side Bend: severe limitations   (L) Rotation: mod limitations   (R) Rotation: mod limitations          Treatment Performed:      Therapeutic Exercise:    43 min  Stepper L2 5 min   Hip ext R/L 55#/55# 20x  Hip flex R/L 22#/22# 20x  8 kg Kb farmers carry 20 feet x4 R/L   10# 3 step walk outs R/L 5x   Hip ADD/ABD machine 55# 20x  PB Walk to bridge 5x5s  Seated 10# band knee to knee torso rotation 20x cw/ccw  SS R/L Pirif, HF, Quads, incline, hams 1'     Manual Therapy:     min      Neuromuscular Re-education:   2 min  Swisswing L quad 2'     Other:      min      Current HEP:   Access Code: [email or web address]  Access Code: [email or web address]     PT Therapeutic Procedures Time Entry  Neuromuscular Re-Education Time Entry: 2  Therapeutic Exercise Time Entry: 43   Units:  TE:3                 Assessment:   Thuy Elizondo is progressing well in physical therapy.   Patient notes thigh has mild muscle soreness but feels better than arrival.  Patient notes still having L abdominal/groin pain which did not change much " with therapy.      Plan:  LE strengthening, LB/core stability dynamic.  Patient plans to promote MD appointment due to abdominal/groin soreness.        Luis Schaefer, PTA

## 2025-04-16 ENCOUNTER — TREATMENT (OUTPATIENT)
Dept: PHYSICAL THERAPY | Facility: CLINIC | Age: 60
End: 2025-04-16
Payer: MEDICAID

## 2025-04-16 DIAGNOSIS — M54.50 LOW BACK PAIN: ICD-10-CM

## 2025-04-16 PROCEDURE — 97110 THERAPEUTIC EXERCISES: CPT | Mod: GP,CQ | Performed by: SPECIALIST/TECHNOLOGIST

## 2025-04-16 ASSESSMENT — PAIN SCALES - GENERAL: PAINLEVEL_OUTOF10: 4

## 2025-04-16 ASSESSMENT — PAIN - FUNCTIONAL ASSESSMENT: PAIN_FUNCTIONAL_ASSESSMENT: 0-10

## 2025-04-16 NOTE — PROGRESS NOTES
Physical Therapy  Physical Therapy Treatment Note    Patient Name: Thuy Elizondo  MRN: [id number]  Today's Date: 4/16/2025  Time Calculation  Start Time: 0910  Stop Time: 0956  Time Calculation (min): 46 min    Insurance:  Visit number: 6 of 30  Authorization info: no auth   Insurance Type: [employer]     General:  Reason for visit: Chronic LB pain w/ sciatica (L)  Referred by: Osorio Gabriel     Current Problem  1. Low back pain  Follow Up In Physical Therapy        Precautions: None     Subjective:     Patient notes no change in pain since last session although noted feeling better until after work the day after last session.  Patient notes no groin pain this AM (although notes usually happens after several hours of activity).      Pain  Pain Assessment: 0-10  0-10 (Numeric) Pain Score: 4    Performing HEP?: Yes    Objective:   Lumbar AROM (%)     Flexion: mod limitations   Extension: severe limitations   (L) Side Bend: severe limitations   (R) Side Bend: severe limitations   (L) Rotation: mod limitations   (R) Rotation: mod limitations        Treatment Performed:    Therapeutic Exercise:    43 min  Stepper L2 5 min   Hip ext R/L 55#/55# 20x  Hip flex R/L 22#/22# 20x  8 kg Kb farmers carry 20 feet x4 R/L   10# 3 step walk outs R/L 5x   Hip ADD/ABD machine 55# 20x  PB Walk to bridge 5x5s  Seated 10# band knee to knee torso rotation 20x cw/ccw  Valslide HIR/HER R/L/B 10x  SS R/L Pirif, HF, Quads, incline, hams 1'     Manual Therapy:     min      Neuromuscular Re-education:   2 min  Swisswing L quad 2'     Other:      min      Current HEP:   Access Code: [email or web address]  Access Code: [email or web address]     PT Therapeutic Procedures Time Entry  Therapeutic Exercise Time Entry: 46   Units:  TE:3                 Assessment:   Thuy Elizondo is progressing well in physical therapy.   Patient tolerated intensity with mild fatigue noting no increase in symptoms post treatment.      Plan:  LE strengthening, LB/core stability dynamic.   Patient sees OB on Friday to check abdominal/groin soreness.       Luis Schaefer, PTA

## 2025-04-18 ENCOUNTER — APPOINTMENT (OUTPATIENT)
Facility: CLINIC | Age: 60
End: 2025-04-18
Payer: MEDICAID

## 2025-04-18 VITALS
BODY MASS INDEX: 37.02 KG/M2 | HEART RATE: 80 BPM | DIASTOLIC BLOOD PRESSURE: 80 MMHG | SYSTOLIC BLOOD PRESSURE: 124 MMHG | WEIGHT: 209 LBS

## 2025-04-18 DIAGNOSIS — R10.2 PELVIC PAIN IN FEMALE: Primary | ICD-10-CM

## 2025-04-18 PROBLEM — N92.6 IRREGULAR MENSES: Status: RESOLVED | Noted: 2023-10-05 | Resolved: 2025-04-18

## 2025-04-18 PROBLEM — N95.0 POST-MENOPAUSAL BLEEDING: Status: RESOLVED | Noted: 2023-10-05 | Resolved: 2025-04-18

## 2025-04-18 PROCEDURE — 3079F DIAST BP 80-89 MM HG: CPT | Performed by: OBSTETRICS & GYNECOLOGY

## 2025-04-18 PROCEDURE — 3074F SYST BP LT 130 MM HG: CPT | Performed by: OBSTETRICS & GYNECOLOGY

## 2025-04-18 PROCEDURE — 99214 OFFICE O/P EST MOD 30 MIN: CPT | Performed by: OBSTETRICS & GYNECOLOGY

## 2025-04-23 ENCOUNTER — TREATMENT (OUTPATIENT)
Dept: PHYSICAL THERAPY | Facility: CLINIC | Age: 60
End: 2025-04-23
Payer: MEDICAID

## 2025-04-23 DIAGNOSIS — M54.50 LOW BACK PAIN: ICD-10-CM

## 2025-04-23 PROCEDURE — 97110 THERAPEUTIC EXERCISES: CPT | Mod: GP,CQ | Performed by: SPECIALIST/TECHNOLOGIST

## 2025-04-23 PROCEDURE — 97112 NEUROMUSCULAR REEDUCATION: CPT | Mod: GP,CQ | Performed by: SPECIALIST/TECHNOLOGIST

## 2025-04-23 NOTE — PROGRESS NOTES
Physical Therapy  Physical Therapy Treatment Note    Patient Name: Thuy Elizondo  MRN: [id number]  Today's Date: 4/23/2025  Time Calculation  Start Time: 0945  Stop Time: 1031  Time Calculation (min): 46 min    Insurance:  Visit number: 7 of 30  Authorization info: no auth   Insurance Type: [employer]     General:  Reason for visit: Chronic LB pain w/ sciatica (L)  Referred by: Osorio Gabriel     Current Problem  1. Low back pain  Follow Up In Physical Therapy        Precautions: None     Subjective:     Patient notes having 1 day with no groin pain at all (Monday).  Patient saw MD on Friday who is not particularly concerned about groin pain but did order and Ultrasound just to verify no internal derangements.  Patient notes  overall becoming stronger.  Patient did note MD found kidney stones on groin pain side so passing stone may be underlying cause.     Pain       Performing HEP?: Yes    Objective:   Lumbar AROM (%)     Flexion: mod limitations   Extension: severe limitations   (L) Side Bend: severe limitations   (R) Side Bend: severe limitations   (L) Rotation: mod limitations   (R) Rotation: mod limitations        Treatment Performed:    Therapeutic Exercise:    43 min  Sci-Fit L2 5 min   Hip ext R/L 66#/66# 20x  Hip flex R/L 22#/22# 20x  8 kg Kb farmers carry 20 feet x4 R/L   10# 3 step walk outs R/L 5x   [place] 0# R/L 20x  Hip ADD/ABD machine 55# 20x  PB Walk to bridge 5x5s  Seated 10# band knee to knee torso rotation 20x cw/ccw  Valslide HIR/HER R/L/B 10x  SS R/L Pirif, HF, Quads, incline, hams 1'     Manual Therapy:     min      Neuromuscular Re-education:   2 min  Swisswing L ant hip  2'     Other:      min      Current HEP:   Access Code: [email or web address]  Access Code: [email or web address]     PT Therapeutic Procedures Time Entry  Neuromuscular Re-Education Time Entry: 15  Therapeutic Exercise Time Entry: 31   Units:  TE:3                 Assessment:   Thuy Elizondo is progressing well in physical therapy.   Patient  tolerated increased intensity with mild fatigue noting no increase in symptoms post treatment.      Plan:  LE strengthening, LB/core stability dynamic.  Patient has two further strengthening sessions prior to re-check with Gin Chavez PT.       Luis Schaefer, PTA

## 2025-04-30 ENCOUNTER — DOCUMENTATION (OUTPATIENT)
Dept: PHYSICAL THERAPY | Facility: CLINIC | Age: 60
End: 2025-04-30
Payer: MEDICAID

## 2025-04-30 ENCOUNTER — APPOINTMENT (OUTPATIENT)
Dept: PHYSICAL THERAPY | Facility: CLINIC | Age: 60
End: 2025-04-30
Payer: MEDICAID

## 2025-04-30 DIAGNOSIS — M54.50 LOW BACK PAIN: ICD-10-CM

## 2025-04-30 NOTE — PROGRESS NOTES
Physical Therapy                 Therapy Communication Note    Patient Name: Thuy Elizondo  MRN: [id number]  Department:   Room: Room/bed info not found  Today's Date: 4/30/2025     Discipline: Physical Therapy          Missed Visit Reason:  Patient called this AM stating unable to make it due to transportation problem.      Missed Time: Cancel    Comment:

## 2025-05-07 ENCOUNTER — TREATMENT (OUTPATIENT)
Dept: PHYSICAL THERAPY | Facility: CLINIC | Age: 60
End: 2025-05-07
Payer: MEDICAID

## 2025-05-07 DIAGNOSIS — M54.50 LOW BACK PAIN: ICD-10-CM

## 2025-05-07 PROCEDURE — 97110 THERAPEUTIC EXERCISES: CPT | Mod: GP,CQ | Performed by: SPECIALIST/TECHNOLOGIST

## 2025-05-07 ASSESSMENT — PAIN - FUNCTIONAL ASSESSMENT: PAIN_FUNCTIONAL_ASSESSMENT: 0-10

## 2025-05-07 NOTE — PROGRESS NOTES
Physical Therapy  Physical Therapy Treatment Note    Patient Name: Thuy Elizondo  MRN: [id number]  Today's Date: 5/7/2025  Time Calculation  Start Time: 0945  Stop Time: 1025  Time Calculation (min): 40 min    Insurance:  Visit number: 8 of 30  Authorization info: no auth   Insurance Type: [employer]     General:  Reason for visit: Chronic LB pain w/ sciatica (L)  Referred by: Osorio Gabrile     Current Problem  1. Low back pain  Follow Up In Physical Therapy        Precautions: None     Subjective:     Patient arrived full weight bearing noting activities are becoming easier and discomfort is happening later in the day.  Patient notes low level pain 2-3 on arrival.      Pain  Pain Assessment: 0-10  0-10 (Numeric) Pain Score:  (2-3)    Performing HEP?: Yes    Objective:   Lumbar AROM (%)     Flexion: mod limitations   Extension: severe limitations   (L) Side Bend: severe limitations   (R) Side Bend: severe limitations   (L) Rotation: mod limitations   (R) Rotation: mod limitations        Treatment Performed:    Therapeutic Exercise:    38 min  Sci-Fit L2 5 min   Hip ext R/L 66#/66# 20x  Hip flex R/L 22#/22# 20x  8 kg Kb farmers carry 20 feet x4 R/L   10# 3 step walk outs R/L 5x   Hip ext R/L 66#/66# 20x   Sartorius 0# R/L 20x  Hip ADD/ABD machine 55# 20x  PB Walk to bridge 5x5s  Seated 10# band knee to knee torso rotation 20x cw/ccw  Valslide HIR/HER R/L/B 10x  SS R/L Pirif, HF, Quads, incline, hams 1'     Manual Therapy:     min      Neuromuscular Re-education:   2 min  Swisswing L ant hip  2'     Other:      min      Current HEP:   Access Code: [email or web address]  Access Code: [email or web address]     PT Therapeutic Procedures Time Entry  Neuromuscular Re-Education Time Entry: 2  Therapeutic Exercise Time Entry: 38   Units:  TE:3                 Assessment:   Thuy Elizondo is progressing well in physical therapy.   Patient noted feeling good post treatment.      Plan:  LE strengthening, LB/core stability dynamic.  Patient has 1-2  further strengthening sessions prior to re-check with Gin Chavez, PT.       Luis Schaefer, PTA

## 2025-05-09 DIAGNOSIS — I10 BENIGN DIASTOLIC HYPERTENSION: ICD-10-CM

## 2025-05-09 RX ORDER — CHLORTHALIDONE 25 MG/1
25 TABLET ORAL DAILY
Qty: 45 TABLET | Refills: 1 | Status: SHIPPED | OUTPATIENT
Start: 2025-05-09

## 2025-05-14 ENCOUNTER — HOSPITAL ENCOUNTER (OUTPATIENT)
Dept: RADIOLOGY | Facility: CLINIC | Age: 60
Discharge: HOME | End: 2025-05-14
Payer: MEDICAID

## 2025-05-14 ENCOUNTER — TREATMENT (OUTPATIENT)
Dept: PHYSICAL THERAPY | Facility: CLINIC | Age: 60
End: 2025-05-14
Payer: MEDICAID

## 2025-05-14 DIAGNOSIS — M54.50 LOW BACK PAIN: ICD-10-CM

## 2025-05-14 DIAGNOSIS — R20.0 NUMBNESS AND TINGLING OF RIGHT LEG: Primary | ICD-10-CM

## 2025-05-14 DIAGNOSIS — R29.898 WEAKNESS OF BOTH LOWER EXTREMITIES: ICD-10-CM

## 2025-05-14 DIAGNOSIS — R20.2 NUMBNESS AND TINGLING OF RIGHT LEG: Primary | ICD-10-CM

## 2025-05-14 DIAGNOSIS — R10.2 PELVIC PAIN IN FEMALE: ICD-10-CM

## 2025-05-14 DIAGNOSIS — M53.86 DECREASED RANGE OF MOTION OF LUMBAR SPINE: ICD-10-CM

## 2025-05-14 PROCEDURE — 76856 US EXAM PELVIC COMPLETE: CPT

## 2025-05-14 PROCEDURE — 97110 THERAPEUTIC EXERCISES: CPT | Mod: GP

## 2025-05-14 NOTE — PROGRESS NOTES
Physical Therapy  Physical Therapy Treatment Note    Patient Name: Thuy Elizondo  MRN: [id number]  Today's Date: 5/14/2025  Time Calculation  Start Time: 1000  Stop Time: 1040  Time Calculation (min): 40 min    Insurance:  Visit number: 9 of 30  Authorization info: no auth   Insurance Type: [employer]     General:  Reason for visit: Chronic LB pain w/ sciatica (L)  Referred by: Osorio Gabriel     Current Problem  1. Numbness and tingling of right leg        2. Low back pain  Follow Up In Physical Therapy      3. Weakness of both lower extremities        4. Decreased range of motion of lumbar spine          Precautions: None     Subjective:   Patient reports she's been having some stabbing pain in her L groin area. Is getting it looked at later today.  Continues to experience sciatic like symptoms, continuous mild numbness in her A thigh. Numbness sensation progresses after an hour of standing but declines pain.       Pain   5/10    Performing HEP?: Yes    Objective:   Gait: NBOS, decreased margot     Treatment Performed:    Therapeutic Exercise:    40 min  Sci-Fit L2 5 min   Bridge SL   modified side plank   Cable twists #10,15#  Curl up w/ 2 sec hold  Standing ABD green band  Figure 4 bridges    Manual Therapy:     min      Neuromuscular Re-education:    min      Other:      min      Current HEP:   Access Code: [email or web address]  URL: [email or web address]  Date: 05/14/2025  Prepared by: Gin Chavez    Exercises  - Figure 4 Bridge  - 1 x daily - 7 x weekly - 2 sets - 10 reps - 1 sec hold  - Hip Abduction with Resistance Loop  - 1 x daily - 7 x weekly - 2 sets - 10 reps  - Curl Up with Reach  - 1 x daily - 7 x weekly - 2 sets - 15 reps - 2 sec hold  - Side Plank on Knees  - 1 x daily - 7 x weekly - 2 sets - 30 sec hold     PT Therapeutic Procedures Time Entry  Therapeutic Exercise Time Entry: 40   Units:  TE:3                 Assessment:   Thuy Elizondo is progressing well in physical therapy.  Patient continues to demonstrate improvements in symptom management and LE/LB strength between sessions.  Patient was provided an updated HEP to continue to challenges these areas.  Patient continues to demonstrate deficits in L leg numbness that onset after ~ 1 hour of standing, as well as LE weakness.  Patient would continue to benefit from skilled care to address these deficits.     Plan:  LE, core, LB strengthening       Gin Chavez, PT

## 2025-05-15 ENCOUNTER — APPOINTMENT (OUTPATIENT)
Dept: RADIOLOGY | Facility: CLINIC | Age: 60
End: 2025-05-15
Payer: MEDICAID

## 2025-05-15 NOTE — RESULT ENCOUNTER NOTE
The pelvic ultrasound looks reassuring.  The uterus is small measuring 6.2 cm and the endometrium is thin.  They do see a small fibroid in the lower left part of the uterus.  A fibroid is unlikely to give you pain, as it has been present for decades.  Specifically the ovaries are not visualized, but that is normal in menopause.  The ovaries are typically very small and hidden by the bowels.  The discomfort likely is related to a gastrointestinal or musculoskeletal source.  Call me with any symptoms that are worsening.

## 2025-05-16 ENCOUNTER — APPOINTMENT (OUTPATIENT)
Facility: CLINIC | Age: 60
End: 2025-05-16
Payer: MEDICAID

## 2025-05-21 ENCOUNTER — TREATMENT (OUTPATIENT)
Dept: PHYSICAL THERAPY | Facility: CLINIC | Age: 60
End: 2025-05-21
Payer: MEDICAID

## 2025-05-21 DIAGNOSIS — M54.50 LOW BACK PAIN: ICD-10-CM

## 2025-05-21 DIAGNOSIS — R20.2 NUMBNESS AND TINGLING OF RIGHT LEG: Primary | ICD-10-CM

## 2025-05-21 DIAGNOSIS — M53.86 DECREASED RANGE OF MOTION OF LUMBAR SPINE: ICD-10-CM

## 2025-05-21 DIAGNOSIS — R29.898 WEAKNESS OF BOTH LOWER EXTREMITIES: ICD-10-CM

## 2025-05-21 DIAGNOSIS — R20.0 NUMBNESS AND TINGLING OF RIGHT LEG: Primary | ICD-10-CM

## 2025-05-21 PROCEDURE — 97110 THERAPEUTIC EXERCISES: CPT | Mod: GP

## 2025-05-21 NOTE — PROGRESS NOTES
Physical Therapy  Physical Therapy Treatment Note    Patient Name: Thuy Elizondo  MRN: [id number]  Today's Date: 5/21/2025  Time Calculation  Start Time: 1004  Stop Time: 1046  Time Calculation (min): 42 min    Insurance:  Visit number: 10 of 30  Authorization info: no auth   Insurance Type: [employer]     General:  Reason for visit: Chronic LB pain w/ sciatica (L)  Referred by: Osorio Gabriel     Current Problem  1. Numbness and tingling of right leg        2. Low back pain  Follow Up In Physical Therapy      3. Weakness of both lower extremities        4. Decreased range of motion of lumbar spine          Precautions: None     Subjective:   Patient reports she continues to have sciatic like pain down her L leg.  However, she notes it is manageable and is able to perform her ADLs/IADLs.  Wants to work on her L LE & core strength these next two weeks before she's discharged.       Pain   5/10    Performing HEP?: Yes    Objective:   MMT 5/5 hip abd     Treatment Performed:    Therapeutic Exercise:    42 min  Sci-Fit L2 5 min   Alphabet cable 10#  SL iso abd w/ 5 sec hold  Black band rows  4# step ups forward and lateral    Manual Therapy:     min      Neuromuscular Re-education:    min      Other:      min      Current HEP:   Access Code: [email or web address]  URL: [email or web address]  Date: 05/14/2025  Prepared by: Gin Chavez    Exercises  - Figure 4 Bridge  - 1 x daily - 7 x weekly - 2 sets - 10 reps - 1 sec hold  - Hip Abduction with Resistance Loop  - 1 x daily - 7 x weekly - 2 sets - 10 reps  - Curl Up with Reach  - 1 x daily - 7 x weekly - 2 sets - 15 reps - 2 sec hold  - Side Plank on Knees  - 1 x daily - 7 x weekly - 2 sets - 30 sec hold     PT Therapeutic Procedures Time Entry  Therapeutic Exercise Time Entry: 42   Units:  TE:3                 Assessment:   Thuy Elizondo is progressing well in physical therapy.  Patient demonstrated improvements in LE and core strength between sessions.   Today's session focused on progressing these areas to help support her chronic complaints of LB and sciatic pain, they tolerated today's session well.  Demonstrating expected within session fatigue and no exacerbations in symptoms.  However, patient continues to demonstrate deficits in L LE weakness & sciatic pain.  The skills of a physical therapist are required to continue to progress their deficit, returning them to a full participation in all their ADL w/o pain/limitations.  Patient was given an additional HEP to provide verity to her program.       Plan:  LE, core, LB strengthening       Gin Chavez, PT

## 2025-05-28 ENCOUNTER — APPOINTMENT (OUTPATIENT)
Dept: PHYSICAL THERAPY | Facility: CLINIC | Age: 60
End: 2025-05-28
Payer: MEDICAID

## 2025-05-29 DIAGNOSIS — E03.9 HYPOTHYROIDISM, UNSPECIFIED TYPE: ICD-10-CM

## 2025-05-29 RX ORDER — LEVOTHYROXINE SODIUM 100 UG/1
100 TABLET ORAL DAILY
Qty: 90 TABLET | Refills: 1 | Status: SHIPPED | OUTPATIENT
Start: 2025-05-29

## 2025-06-04 ENCOUNTER — APPOINTMENT (OUTPATIENT)
Dept: PHYSICAL THERAPY | Facility: CLINIC | Age: 60
End: 2025-06-04
Payer: MEDICAID

## 2025-06-04 DIAGNOSIS — R20.2 NUMBNESS AND TINGLING OF RIGHT LEG: Primary | ICD-10-CM

## 2025-06-04 DIAGNOSIS — M54.50 LOW BACK PAIN: ICD-10-CM

## 2025-06-04 DIAGNOSIS — R29.898 WEAKNESS OF BOTH LOWER EXTREMITIES: ICD-10-CM

## 2025-06-04 DIAGNOSIS — M53.86 DECREASED RANGE OF MOTION OF LUMBAR SPINE: ICD-10-CM

## 2025-06-04 DIAGNOSIS — R20.0 NUMBNESS AND TINGLING OF RIGHT LEG: Primary | ICD-10-CM

## 2025-06-04 PROCEDURE — 97112 NEUROMUSCULAR REEDUCATION: CPT | Mod: GP,CQ | Performed by: SPECIALIST/TECHNOLOGIST

## 2025-06-04 PROCEDURE — 97110 THERAPEUTIC EXERCISES: CPT | Mod: GP,CQ | Performed by: SPECIALIST/TECHNOLOGIST

## 2025-06-04 NOTE — PROGRESS NOTES
"  Physical Therapy  Physical Therapy Treatment Note    Patient Name: Thuy Elizondo  MRN: [id number]  Today's Date: 6/4/2025  Time Calculation  Start Time: 1330  Stop Time: 1419  Time Calculation (min): 49 min    Insurance:  Visit number: 11 of 30  Authorization info: no auth   Insurance Type: [employer]     General:  Reason for visit: Chronic LB pain w/ sciatica (L)  Referred by: Osorio Gabriel     Current Problem  1. Numbness and tingling of right leg        2. Low back pain  Follow Up In Physical Therapy      3. Weakness of both lower extremities        4. Decreased range of motion of lumbar spine          Precautions: None     Subjective:   Patient reports feeling better with typical sciatic symptoms into thigh.     Pain   5/10    Performing HEP?: Yes    Objective:   MMT 5/5 hip abd   Hip ext 4   Abdominals 3-    Pirif 20°  Quads 4\"   Hip ext 2\"    LEFS 61    Treatment Performed:    Therapeutic Exercise:    42 min  Sci-Fit L2 5 min   Crunch style abdominals 10x3s  Iso SB R/L 6 kg KB 1'   Bravo side 3-step R/L 7.5# 7x  Hip ext R/L 66#/66# 20x    0# sartorius R/L 20x   Hip abd/add 55#/55# 20x  Valslide HIR/HER R/L/B 15x  SS R/L H, HF, Pirif, Quads 1'     Manual Therapy:     min      Neuromuscular Re-education:  10 min  DBE 2x2 min   4 kg KB narrow base & swing cw/ccw 10x R/L       Other:      min      Current HEP:   Access Code: [email or web address]  URL: [email or web address]  Date: 05/14/2025  Prepared by: Gin Chavez    Exercises  - Figure 4 Bridge  - 1 x daily - 7 x weekly - 2 sets - 10 reps - 1 sec hold  - Hip Abduction with Resistance Loop  - 1 x daily - 7 x weekly - 2 sets - 10 reps  - Curl Up with Reach  - 1 x daily - 7 x weekly - 2 sets - 15 reps - 2 sec hold  - Side Plank on Knees  - 1 x daily - 7 x weekly - 2 sets - 30 sec hold     PT Therapeutic Procedures Time Entry  Neuromuscular Re-Education Time Entry: 15  Therapeutic Exercise Time Entry: 34   Units:  TE:3                 Assessment: "   Thuy Elizondo is progressing well in physical therapy.  Patient demonstrated improvements in LE and core strength between sessions.  Today's session focused on progressing these areas to help support her chronic complaints of LB and sciatic pain, they tolerated today's session well.  Demonstrating expected within session fatigue and no exacerbations in symptoms.  However, patient continues to demonstrate deficits in L LE weakness & sciatic pain.  The skills of a physical therapist are required to continue to progress their deficit, returning them to a full participation in all their ADL w/o pain/limitations.     Plan:  Discharge per plan of care.        Luis Schaefer, PTA

## 2025-06-20 DIAGNOSIS — M54.16 LUMBAR RADICULOPATHY: ICD-10-CM

## 2025-06-23 RX ORDER — MELOXICAM 15 MG/1
15 TABLET ORAL DAILY
Qty: 30 TABLET | Refills: 0 | Status: SHIPPED | OUTPATIENT
Start: 2025-06-23

## 2025-08-06 DIAGNOSIS — Z12.31 ENCOUNTER FOR SCREENING MAMMOGRAM FOR BREAST CANCER: ICD-10-CM

## 2025-08-12 ENCOUNTER — DOCUMENTATION (OUTPATIENT)
Dept: PHYSICAL THERAPY | Facility: CLINIC | Age: 60
End: 2025-08-12
Payer: MEDICAID

## 2025-08-12 DIAGNOSIS — M53.86 DECREASED RANGE OF MOTION OF LUMBAR SPINE: ICD-10-CM

## 2025-08-12 DIAGNOSIS — R20.0 NUMBNESS AND TINGLING OF RIGHT LEG: Primary | ICD-10-CM

## 2025-08-12 DIAGNOSIS — R29.898 WEAKNESS OF BOTH LOWER EXTREMITIES: ICD-10-CM

## 2025-08-12 DIAGNOSIS — M54.50 LOW BACK PAIN: ICD-10-CM

## 2025-08-12 DIAGNOSIS — R20.2 NUMBNESS AND TINGLING OF RIGHT LEG: Primary | ICD-10-CM

## 2025-08-25 ENCOUNTER — APPOINTMENT (OUTPATIENT)
Dept: NEUROLOGY | Facility: CLINIC | Age: 60
End: 2025-08-25
Payer: MEDICAID

## 2025-09-03 ENCOUNTER — TELEPHONE (OUTPATIENT)
Dept: PRIMARY CARE | Facility: CLINIC | Age: 60
End: 2025-09-03

## 2025-09-06 ENCOUNTER — OFFICE VISIT (OUTPATIENT)
Dept: URGENT CARE | Age: 60
End: 2025-09-06
Payer: MEDICAID

## 2025-09-06 VITALS
HEART RATE: 78 BPM | DIASTOLIC BLOOD PRESSURE: 87 MMHG | OXYGEN SATURATION: 98 % | TEMPERATURE: 97.5 F | RESPIRATION RATE: 20 BRPM | SYSTOLIC BLOOD PRESSURE: 150 MMHG

## 2025-09-06 DIAGNOSIS — J01.00 ACUTE NON-RECURRENT MAXILLARY SINUSITIS: Primary | ICD-10-CM

## 2025-09-06 PROCEDURE — 99204 OFFICE O/P NEW MOD 45 MIN: CPT

## 2025-09-06 PROCEDURE — 3077F SYST BP >= 140 MM HG: CPT

## 2025-09-06 PROCEDURE — 1036F TOBACCO NON-USER: CPT

## 2025-09-06 PROCEDURE — 3079F DIAST BP 80-89 MM HG: CPT

## 2025-09-06 PROCEDURE — 99070 SPECIAL SUPPLIES PHYS/QHP: CPT

## 2025-09-06 RX ORDER — AMOXICILLIN AND CLAVULANATE POTASSIUM 875; 125 MG/1; MG/1
1 TABLET, FILM COATED ORAL 2 TIMES DAILY
Qty: 14 TABLET | Refills: 0 | Status: SHIPPED | OUTPATIENT
Start: 2025-09-06 | End: 2025-09-13

## 2025-09-06 ASSESSMENT — ENCOUNTER SYMPTOMS
SINUS PAIN: 1
SINUS PRESSURE: 1

## 2025-09-08 ENCOUNTER — APPOINTMENT (OUTPATIENT)
Dept: PRIMARY CARE | Facility: CLINIC | Age: 60
End: 2025-09-08
Payer: MEDICAID

## (undated) DEVICE — CATHETER, IV, ANGIOCATH, 14 G X 5.25 IN, FEP POLYMER

## (undated) DEVICE — TUBE SET, PNEUMOLAR HEATED, SMOKE EVACU, HIGH-FLOW

## (undated) DEVICE — CANNULA, KII ADVANCED FIXATION, 5X100MM W/SEAL

## (undated) DEVICE — SOLUTION, INJECTION, USP, SODIUM CHLORIDE 0.9%, .9, NACL, 1000 ML, BAG

## (undated) DEVICE — RETRIEVAL SYSTEM, MONARCH, 10MM DISP ENDOSCOPIC

## (undated) DEVICE — CLIP, ENDO, CLINCH II, W/RATCHET, ON/OFF, CLINCH II, 5 MM

## (undated) DEVICE — CATHETER, CHOLANGIOGRAM, 18 G X 11

## (undated) DEVICE — SUTURE, MONOCRYL, 4-0, 18 IN, PS2, UNDYED

## (undated) DEVICE — DRAPE, SHEET, THREE QUARTER, FAN FOLD, 57 X 77 IN

## (undated) DEVICE — SYSTEM, TROCAR LAP, 5X100MM, SHIELD BLADED, KII ADVANCED FIX ABDOMINAL

## (undated) DEVICE — PUMP, STRYKERFLOW 2 & HANDPIECE W/10FT. IRRIGATION TUBING

## (undated) DEVICE — Device

## (undated) DEVICE — TOWEL, SURGICAL, NEURO, O/R, 16 X 26, BLUE, STERILE

## (undated) DEVICE — SUTURE, VICRYL, 0, 27 IN, UR-6, VIOLET

## (undated) DEVICE — SPONGE GAUZE, XRAY SC+RFID, 4X4 16 PLY, STERILE

## (undated) DEVICE — DRAPE, C-ARM IMAGE

## (undated) DEVICE — GLOVE, SURGICAL, PROTEXIS PI ORTHO, 7.0, PF, LF

## (undated) DEVICE — SHEAR, W/UNIPOLAR CAUTERY, ENDOSHEAR, 5 MM

## (undated) DEVICE — CLIP, LIGATING, HEM-O-LOCK, MEDIUM/LARGE, LF, GREEN

## (undated) DEVICE — SCOPE WARMER, LAPAROSCOPE, BAG ONLY, LF

## (undated) DEVICE — HOLSTER, JET SAFETY

## (undated) DEVICE — TROCAR SYSTEM, BALLOON, KII GELPORT, 12 X 100MM

## (undated) DEVICE — CORD, MONOPOLAR HIGH FREQUENCY, 8MM PLUG, 300CM